# Patient Record
Sex: FEMALE | Race: WHITE | NOT HISPANIC OR LATINO | Employment: FULL TIME | URBAN - METROPOLITAN AREA
[De-identification: names, ages, dates, MRNs, and addresses within clinical notes are randomized per-mention and may not be internally consistent; named-entity substitution may affect disease eponyms.]

---

## 2017-01-31 ENCOUNTER — ALLSCRIPTS OFFICE VISIT (OUTPATIENT)
Dept: OTHER | Facility: OTHER | Age: 57
End: 2017-01-31

## 2017-02-24 ENCOUNTER — ALLSCRIPTS OFFICE VISIT (OUTPATIENT)
Dept: OTHER | Facility: OTHER | Age: 57
End: 2017-02-24

## 2017-08-08 ENCOUNTER — ALLSCRIPTS OFFICE VISIT (OUTPATIENT)
Dept: OTHER | Facility: OTHER | Age: 57
End: 2017-08-08

## 2017-08-08 DIAGNOSIS — Z12.31 ENCOUNTER FOR SCREENING MAMMOGRAM FOR MALIGNANT NEOPLASM OF BREAST: ICD-10-CM

## 2017-08-08 DIAGNOSIS — Z78.0 ASYMPTOMATIC MENOPAUSAL STATE: ICD-10-CM

## 2017-08-08 LAB — OCCULT BLD, FECAL IMMUNOLOGICAL (HISTORICAL): NEGATIVE

## 2017-08-11 ENCOUNTER — ALLSCRIPTS OFFICE VISIT (OUTPATIENT)
Dept: OTHER | Facility: OTHER | Age: 57
End: 2017-08-11

## 2017-08-13 LAB
ADEQUACY: (HISTORICAL): NORMAL
CLINICIAN PROVIDIED ICD 9 OR 10 (HISTORICAL): NORMAL
COMMENT (HISTORICAL): NORMAL
DIAGNOSIS (HISTORICAL): NORMAL
HPV HIGH RISK (HISTORICAL): NEGATIVE
NOTE: (HISTORICAL): NORMAL
PERFORMED BY (HISTORICAL): NORMAL
TEST INFORMATION (HISTORICAL): NORMAL

## 2017-08-14 ENCOUNTER — GENERIC CONVERSION - ENCOUNTER (OUTPATIENT)
Dept: OTHER | Facility: OTHER | Age: 57
End: 2017-08-14

## 2017-08-25 ENCOUNTER — ALLSCRIPTS OFFICE VISIT (OUTPATIENT)
Dept: OTHER | Facility: OTHER | Age: 57
End: 2017-08-25

## 2017-08-26 ENCOUNTER — APPOINTMENT (EMERGENCY)
Dept: CT IMAGING | Facility: HOSPITAL | Age: 57
End: 2017-08-26
Payer: COMMERCIAL

## 2017-08-26 ENCOUNTER — HOSPITAL ENCOUNTER (EMERGENCY)
Facility: HOSPITAL | Age: 57
Discharge: HOME | End: 2017-08-26
Attending: EMERGENCY MEDICINE | Admitting: EMERGENCY MEDICINE
Payer: COMMERCIAL

## 2017-08-26 VITALS
DIASTOLIC BLOOD PRESSURE: 60 MMHG | SYSTOLIC BLOOD PRESSURE: 123 MMHG | RESPIRATION RATE: 18 BRPM | WEIGHT: 155.42 LBS | HEART RATE: 90 BPM | TEMPERATURE: 98.1 F | OXYGEN SATURATION: 100 %

## 2017-08-26 DIAGNOSIS — L04.9 LYMPHADENITIS, ACUTE: Primary | ICD-10-CM

## 2017-08-26 LAB
ANION GAP SERPL CALCULATED.3IONS-SCNC: 6 MMOL/L (ref 4–13)
BASOPHILS # BLD AUTO: 0.02 THOUSANDS/ΜL (ref 0–0.1)
BASOPHILS NFR BLD AUTO: 0 % (ref 0–1)
BUN SERPL-MCNC: 15 MG/DL (ref 5–25)
CALCIUM SERPL-MCNC: 9 MG/DL (ref 8.3–10.1)
CHLORIDE SERPL-SCNC: 102 MMOL/L (ref 100–108)
CO2 SERPL-SCNC: 29 MMOL/L (ref 21–32)
CREAT SERPL-MCNC: 0.74 MG/DL (ref 0.6–1.3)
EOSINOPHIL # BLD AUTO: 0.06 THOUSAND/ΜL (ref 0–0.61)
EOSINOPHIL NFR BLD AUTO: 0 % (ref 0–6)
ERYTHROCYTE [DISTWIDTH] IN BLOOD BY AUTOMATED COUNT: 13.5 % (ref 11.6–15.1)
GFR SERPL CREATININE-BSD FRML MDRD: 90 ML/MIN/1.73SQ M
GLUCOSE SERPL-MCNC: 99 MG/DL (ref 65–140)
HCT VFR BLD AUTO: 37.7 % (ref 34.8–46.1)
HGB BLD-MCNC: 12.6 G/DL (ref 11.5–15.4)
LYMPHOCYTES # BLD AUTO: 2.01 THOUSANDS/ΜL (ref 0.6–4.47)
LYMPHOCYTES NFR BLD AUTO: 15 % (ref 14–44)
MCH RBC QN AUTO: 29.6 PG (ref 26.8–34.3)
MCHC RBC AUTO-ENTMCNC: 33.4 G/DL (ref 31.4–37.4)
MCV RBC AUTO: 89 FL (ref 82–98)
MONOCYTES # BLD AUTO: 0.94 THOUSAND/ΜL (ref 0.17–1.22)
MONOCYTES NFR BLD AUTO: 7 % (ref 4–12)
NEUTROPHILS # BLD AUTO: 10.78 THOUSANDS/ΜL (ref 1.85–7.62)
NEUTS SEG NFR BLD AUTO: 78 % (ref 43–75)
PLATELET # BLD AUTO: 289 THOUSANDS/UL (ref 149–390)
PMV BLD AUTO: 10.1 FL (ref 8.9–12.7)
POTASSIUM SERPL-SCNC: 4.2 MMOL/L (ref 3.5–5.3)
RBC # BLD AUTO: 4.25 MILLION/UL (ref 3.81–5.12)
SODIUM SERPL-SCNC: 137 MMOL/L (ref 136–145)
WBC # BLD AUTO: 13.81 THOUSAND/UL (ref 4.31–10.16)

## 2017-08-26 PROCEDURE — 36415 COLL VENOUS BLD VENIPUNCTURE: CPT | Performed by: EMERGENCY MEDICINE

## 2017-08-26 PROCEDURE — 70491 CT SOFT TISSUE NECK W/DYE: CPT

## 2017-08-26 PROCEDURE — 85025 COMPLETE CBC W/AUTO DIFF WBC: CPT | Performed by: EMERGENCY MEDICINE

## 2017-08-26 PROCEDURE — 99284 EMERGENCY DEPT VISIT MOD MDM: CPT

## 2017-08-26 PROCEDURE — 80048 BASIC METABOLIC PNL TOTAL CA: CPT | Performed by: EMERGENCY MEDICINE

## 2017-08-26 PROCEDURE — 96374 THER/PROPH/DIAG INJ IV PUSH: CPT

## 2017-08-26 PROCEDURE — 96361 HYDRATE IV INFUSION ADD-ON: CPT

## 2017-08-26 RX ORDER — KETOROLAC TROMETHAMINE 30 MG/ML
15 INJECTION, SOLUTION INTRAMUSCULAR; INTRAVENOUS ONCE
Status: COMPLETED | OUTPATIENT
Start: 2017-08-26 | End: 2017-08-26

## 2017-08-26 RX ORDER — LANOLIN ALCOHOL/MO/W.PET/CERES
1 CREAM (GRAM) TOPICAL 3 TIMES DAILY
COMMUNITY

## 2017-08-26 RX ORDER — AMOXICILLIN AND CLAVULANATE POTASSIUM 875; 125 MG/1; MG/1
1 TABLET, FILM COATED ORAL 2 TIMES DAILY
Qty: 19 TABLET | Refills: 0 | Status: SHIPPED | OUTPATIENT
Start: 2017-08-26 | End: 2017-09-05 | Stop reason: HOSPADM

## 2017-08-26 RX ORDER — NAPROXEN 500 MG/1
500 TABLET ORAL 2 TIMES DAILY PRN
Qty: 20 TABLET | Refills: 0 | Status: SHIPPED | OUTPATIENT
Start: 2017-08-26 | End: 2018-09-08 | Stop reason: ALTCHOICE

## 2017-08-26 RX ORDER — CITALOPRAM 20 MG/1
20 TABLET ORAL DAILY
COMMUNITY
End: 2018-03-03 | Stop reason: SDUPTHER

## 2017-08-26 RX ORDER — AMOXICILLIN AND CLAVULANATE POTASSIUM 875; 125 MG/1; MG/1
1 TABLET, FILM COATED ORAL ONCE
Status: COMPLETED | OUTPATIENT
Start: 2017-08-26 | End: 2017-08-26

## 2017-08-26 RX ORDER — MULTIVITAMIN
1 TABLET ORAL DAILY
COMMUNITY

## 2017-08-26 RX ADMIN — KETOROLAC TROMETHAMINE 15 MG: 30 INJECTION, SOLUTION INTRAMUSCULAR at 10:19

## 2017-08-26 RX ADMIN — AMOXICILLIN AND CLAVULANATE POTASSIUM 1 TABLET: 875; 125 TABLET, FILM COATED ORAL at 10:56

## 2017-08-26 RX ADMIN — IOHEXOL 85 ML: 350 INJECTION, SOLUTION INTRAVENOUS at 09:24

## 2017-08-26 RX ADMIN — SODIUM CHLORIDE 1000 ML: 0.9 INJECTION, SOLUTION INTRAVENOUS at 08:30

## 2017-08-27 ENCOUNTER — GENERIC CONVERSION - ENCOUNTER (OUTPATIENT)
Dept: OTHER | Facility: OTHER | Age: 57
End: 2017-08-27

## 2017-09-01 ENCOUNTER — HOSPITAL ENCOUNTER (INPATIENT)
Facility: HOSPITAL | Age: 57
LOS: 3 days | Discharge: HOME | DRG: 815 | End: 2017-09-05
Attending: EMERGENCY MEDICINE | Admitting: INTERNAL MEDICINE
Payer: COMMERCIAL

## 2017-09-01 DIAGNOSIS — L02.11 ABSCESS, NECK: Primary | ICD-10-CM

## 2017-09-01 LAB
ANION GAP SERPL CALCULATED.3IONS-SCNC: 8 MMOL/L (ref 4–13)
BASOPHILS # BLD AUTO: 0.01 THOUSANDS/ΜL (ref 0–0.1)
BASOPHILS NFR BLD AUTO: 0 % (ref 0–1)
BUN SERPL-MCNC: 24 MG/DL (ref 5–25)
CALCIUM SERPL-MCNC: 8.8 MG/DL (ref 8.3–10.1)
CHLORIDE SERPL-SCNC: 92 MMOL/L (ref 100–108)
CO2 SERPL-SCNC: 27 MMOL/L (ref 21–32)
CREAT SERPL-MCNC: 0.76 MG/DL (ref 0.6–1.3)
EOSINOPHIL # BLD AUTO: 0.06 THOUSAND/ΜL (ref 0–0.61)
EOSINOPHIL NFR BLD AUTO: 0 % (ref 0–6)
ERYTHROCYTE [DISTWIDTH] IN BLOOD BY AUTOMATED COUNT: 12.6 % (ref 11.6–15.1)
GFR SERPL CREATININE-BSD FRML MDRD: 87 ML/MIN/1.73SQ M
GLUCOSE SERPL-MCNC: 108 MG/DL (ref 65–140)
HCT VFR BLD AUTO: 33.9 % (ref 34.8–46.1)
HGB BLD-MCNC: 11.8 G/DL (ref 11.5–15.4)
LYMPHOCYTES # BLD AUTO: 1.87 THOUSANDS/ΜL (ref 0.6–4.47)
LYMPHOCYTES NFR BLD AUTO: 13 % (ref 14–44)
MCH RBC QN AUTO: 30 PG (ref 26.8–34.3)
MCHC RBC AUTO-ENTMCNC: 34.8 G/DL (ref 31.4–37.4)
MCV RBC AUTO: 86 FL (ref 82–98)
MONOCYTES # BLD AUTO: 1.21 THOUSAND/ΜL (ref 0.17–1.22)
MONOCYTES NFR BLD AUTO: 9 % (ref 4–12)
NEUTROPHILS # BLD AUTO: 10.99 THOUSANDS/ΜL (ref 1.85–7.62)
NEUTS SEG NFR BLD AUTO: 78 % (ref 43–75)
NRBC BLD AUTO-RTO: 0 /100 WBCS
PLATELET # BLD AUTO: 377 THOUSANDS/UL (ref 149–390)
PMV BLD AUTO: 10.5 FL (ref 8.9–12.7)
POTASSIUM SERPL-SCNC: 3.9 MMOL/L (ref 3.5–5.3)
RBC # BLD AUTO: 3.93 MILLION/UL (ref 3.81–5.12)
SODIUM SERPL-SCNC: 127 MMOL/L (ref 136–145)
WBC # BLD AUTO: 14.18 THOUSAND/UL (ref 4.31–10.16)

## 2017-09-01 PROCEDURE — 99284 EMERGENCY DEPT VISIT MOD MDM: CPT

## 2017-09-01 PROCEDURE — 85025 COMPLETE CBC W/AUTO DIFF WBC: CPT | Performed by: EMERGENCY MEDICINE

## 2017-09-01 PROCEDURE — 87040 BLOOD CULTURE FOR BACTERIA: CPT | Performed by: EMERGENCY MEDICINE

## 2017-09-01 PROCEDURE — 36415 COLL VENOUS BLD VENIPUNCTURE: CPT | Performed by: EMERGENCY MEDICINE

## 2017-09-01 PROCEDURE — 80048 BASIC METABOLIC PNL TOTAL CA: CPT | Performed by: EMERGENCY MEDICINE

## 2017-09-01 RX ORDER — ONDANSETRON 2 MG/ML
4 INJECTION INTRAMUSCULAR; INTRAVENOUS ONCE
Status: COMPLETED | OUTPATIENT
Start: 2017-09-01 | End: 2017-09-01

## 2017-09-01 RX ORDER — ACETAMINOPHEN 160 MG/5ML
650 SUSPENSION, ORAL (FINAL DOSE FORM) ORAL EVERY 4 HOURS PRN
Status: DISCONTINUED | OUTPATIENT
Start: 2017-09-01 | End: 2017-09-05 | Stop reason: HOSPADM

## 2017-09-01 RX ORDER — CITALOPRAM 20 MG/1
20 TABLET ORAL DAILY
Status: DISCONTINUED | OUTPATIENT
Start: 2017-09-02 | End: 2017-09-05 | Stop reason: HOSPADM

## 2017-09-01 RX ORDER — KETOROLAC TROMETHAMINE 30 MG/ML
15 INJECTION, SOLUTION INTRAMUSCULAR; INTRAVENOUS EVERY 6 HOURS PRN
Status: DISPENSED | OUTPATIENT
Start: 2017-09-01 | End: 2017-09-04

## 2017-09-01 RX ORDER — MORPHINE SULFATE 4 MG/ML
4 INJECTION, SOLUTION INTRAMUSCULAR; INTRAVENOUS ONCE
Status: COMPLETED | OUTPATIENT
Start: 2017-09-01 | End: 2017-09-01

## 2017-09-01 RX ORDER — DEXAMETHASONE SODIUM PHOSPHATE 4 MG/ML
4 INJECTION, SOLUTION INTRA-ARTICULAR; INTRALESIONAL; INTRAMUSCULAR; INTRAVENOUS; SOFT TISSUE EVERY 12 HOURS
Status: DISCONTINUED | OUTPATIENT
Start: 2017-09-02 | End: 2017-09-04

## 2017-09-01 RX ORDER — KETOROLAC TROMETHAMINE 30 MG/ML
15 INJECTION, SOLUTION INTRAMUSCULAR; INTRAVENOUS ONCE
Status: COMPLETED | OUTPATIENT
Start: 2017-09-01 | End: 2017-09-01

## 2017-09-01 RX ORDER — CITALOPRAM 10 MG/1
10 TABLET ORAL DAILY
Status: CANCELLED | OUTPATIENT
Start: 2017-09-02

## 2017-09-01 RX ORDER — CLINDAMYCIN PHOSPHATE 600 MG/50ML
600 INJECTION INTRAVENOUS ONCE
Status: DISCONTINUED | OUTPATIENT
Start: 2017-09-01 | End: 2017-09-01

## 2017-09-01 RX ORDER — MORPHINE SULFATE 2 MG/ML
2 INJECTION, SOLUTION INTRAMUSCULAR; INTRAVENOUS EVERY 4 HOURS PRN
Status: DISCONTINUED | OUTPATIENT
Start: 2017-09-01 | End: 2017-09-02

## 2017-09-01 RX ADMIN — KETOROLAC TROMETHAMINE 15 MG: 30 INJECTION, SOLUTION INTRAMUSCULAR at 21:20

## 2017-09-01 RX ADMIN — ONDANSETRON 4 MG: 2 INJECTION INTRAMUSCULAR; INTRAVENOUS at 21:21

## 2017-09-01 RX ADMIN — AMPICILLIN SODIUM AND SULBACTAM SODIUM 3 G: 2; 1 INJECTION, POWDER, FOR SOLUTION INTRAMUSCULAR; INTRAVENOUS at 23:48

## 2017-09-01 RX ADMIN — DEXAMETHASONE SODIUM PHOSPHATE 4 MG: 4 INJECTION, SOLUTION INTRAMUSCULAR; INTRAVENOUS at 23:48

## 2017-09-01 RX ADMIN — MORPHINE SULFATE 2 MG: 4 INJECTION, SOLUTION INTRAMUSCULAR; INTRAVENOUS at 21:21

## 2017-09-01 RX ADMIN — MORPHINE SULFATE 2 MG: 2 INJECTION, SOLUTION INTRAMUSCULAR; INTRAVENOUS at 23:52

## 2017-09-02 ENCOUNTER — APPOINTMENT (INPATIENT)
Dept: RADIOLOGY | Facility: HOSPITAL | Age: 57
DRG: 815 | End: 2017-09-02
Payer: COMMERCIAL

## 2017-09-02 PROBLEM — D72.829 LEUKOCYTOSIS: Status: ACTIVE | Noted: 2017-09-02

## 2017-09-02 PROBLEM — L02.11 ABSCESS, NECK: Status: ACTIVE | Noted: 2017-09-02

## 2017-09-02 PROBLEM — E87.1 HYPONATREMIA: Status: ACTIVE | Noted: 2017-09-02

## 2017-09-02 LAB
ANION GAP SERPL CALCULATED.3IONS-SCNC: 8 MMOL/L (ref 4–13)
BUN SERPL-MCNC: 24 MG/DL (ref 5–25)
CALCIUM SERPL-MCNC: 8.9 MG/DL (ref 8.3–10.1)
CHLORIDE SERPL-SCNC: 94 MMOL/L (ref 100–108)
CO2 SERPL-SCNC: 25 MMOL/L (ref 21–32)
CREAT SERPL-MCNC: 0.83 MG/DL (ref 0.6–1.3)
ERYTHROCYTE [DISTWIDTH] IN BLOOD BY AUTOMATED COUNT: 12.8 % (ref 11.6–15.1)
GFR SERPL CREATININE-BSD FRML MDRD: 79 ML/MIN/1.73SQ M
GLUCOSE SERPL-MCNC: 125 MG/DL (ref 65–140)
HCT VFR BLD AUTO: 32.9 % (ref 34.8–46.1)
HGB BLD-MCNC: 11.5 G/DL (ref 11.5–15.4)
MAGNESIUM SERPL-MCNC: 2.5 MG/DL (ref 1.6–2.6)
MCH RBC QN AUTO: 29.9 PG (ref 26.8–34.3)
MCHC RBC AUTO-ENTMCNC: 35 G/DL (ref 31.4–37.4)
MCV RBC AUTO: 86 FL (ref 82–98)
PLATELET # BLD AUTO: 335 THOUSANDS/UL (ref 149–390)
PMV BLD AUTO: 9.9 FL (ref 8.9–12.7)
POTASSIUM SERPL-SCNC: 4.4 MMOL/L (ref 3.5–5.3)
RBC # BLD AUTO: 3.85 MILLION/UL (ref 3.81–5.12)
SODIUM SERPL-SCNC: 127 MMOL/L (ref 136–145)
WBC # BLD AUTO: 16.31 THOUSAND/UL (ref 4.31–10.16)

## 2017-09-02 PROCEDURE — 87206 SMEAR FLUORESCENT/ACID STAI: CPT | Performed by: OTOLARYNGOLOGY

## 2017-09-02 PROCEDURE — 87077 CULTURE AEROBIC IDENTIFY: CPT | Performed by: OTOLARYNGOLOGY

## 2017-09-02 PROCEDURE — 80048 BASIC METABOLIC PNL TOTAL CA: CPT | Performed by: INTERNAL MEDICINE

## 2017-09-02 PROCEDURE — 87205 SMEAR GRAM STAIN: CPT | Performed by: OTOLARYNGOLOGY

## 2017-09-02 PROCEDURE — 83735 ASSAY OF MAGNESIUM: CPT | Performed by: INTERNAL MEDICINE

## 2017-09-02 PROCEDURE — 85027 COMPLETE CBC AUTOMATED: CPT | Performed by: INTERNAL MEDICINE

## 2017-09-02 PROCEDURE — 87075 CULTR BACTERIA EXCEPT BLOOD: CPT | Performed by: OTOLARYNGOLOGY

## 2017-09-02 PROCEDURE — 0W923ZX DRAINAGE OF FACE, PERCUTANEOUS APPROACH, DIAGNOSTIC: ICD-10-PCS | Performed by: OTOLARYNGOLOGY

## 2017-09-02 PROCEDURE — 87116 MYCOBACTERIA CULTURE: CPT | Performed by: OTOLARYNGOLOGY

## 2017-09-02 PROCEDURE — 76536 US EXAM OF HEAD AND NECK: CPT

## 2017-09-02 PROCEDURE — 86480 TB TEST CELL IMMUN MEASURE: CPT | Performed by: PHYSICIAN ASSISTANT

## 2017-09-02 PROCEDURE — 87070 CULTURE OTHR SPECIMN AEROBIC: CPT | Performed by: OTOLARYNGOLOGY

## 2017-09-02 RX ORDER — SODIUM CHLORIDE 9 MG/ML
100 INJECTION, SOLUTION INTRAVENOUS CONTINUOUS
Status: DISCONTINUED | OUTPATIENT
Start: 2017-09-02 | End: 2017-09-02

## 2017-09-02 RX ORDER — MORPHINE SULFATE 2 MG/ML
2 INJECTION, SOLUTION INTRAMUSCULAR; INTRAVENOUS
Status: DISCONTINUED | OUTPATIENT
Start: 2017-09-02 | End: 2017-09-05 | Stop reason: HOSPADM

## 2017-09-02 RX ORDER — MORPHINE SULFATE 2 MG/ML
2 INJECTION, SOLUTION INTRAMUSCULAR; INTRAVENOUS ONCE
Status: COMPLETED | OUTPATIENT
Start: 2017-09-02 | End: 2017-09-02

## 2017-09-02 RX ADMIN — KETOROLAC TROMETHAMINE 15 MG: 30 INJECTION, SOLUTION INTRAMUSCULAR at 21:28

## 2017-09-02 RX ADMIN — KETOROLAC TROMETHAMINE 15 MG: 30 INJECTION, SOLUTION INTRAMUSCULAR at 10:09

## 2017-09-02 RX ADMIN — MORPHINE SULFATE 2 MG: 2 INJECTION, SOLUTION INTRAMUSCULAR; INTRAVENOUS at 12:47

## 2017-09-02 RX ADMIN — DEXAMETHASONE SODIUM PHOSPHATE 4 MG: 4 INJECTION, SOLUTION INTRAMUSCULAR; INTRAVENOUS at 23:40

## 2017-09-02 RX ADMIN — AMPICILLIN SODIUM AND SULBACTAM SODIUM 3 G: 2; 1 INJECTION, POWDER, FOR SOLUTION INTRAMUSCULAR; INTRAVENOUS at 10:03

## 2017-09-02 RX ADMIN — SODIUM CHLORIDE 100 ML/HR: 0.9 INJECTION, SOLUTION INTRAVENOUS at 10:01

## 2017-09-02 RX ADMIN — DEXAMETHASONE SODIUM PHOSPHATE 4 MG: 4 INJECTION, SOLUTION INTRAMUSCULAR; INTRAVENOUS at 11:50

## 2017-09-02 RX ADMIN — AMPICILLIN SODIUM AND SULBACTAM SODIUM 3 G: 2; 1 INJECTION, POWDER, FOR SOLUTION INTRAMUSCULAR; INTRAVENOUS at 05:27

## 2017-09-02 RX ADMIN — AMPICILLIN SODIUM AND SULBACTAM SODIUM 3 G: 2; 1 INJECTION, POWDER, FOR SOLUTION INTRAMUSCULAR; INTRAVENOUS at 23:16

## 2017-09-02 RX ADMIN — MORPHINE SULFATE 2 MG: 2 INJECTION, SOLUTION INTRAMUSCULAR; INTRAVENOUS at 05:13

## 2017-09-02 RX ADMIN — CITALOPRAM HYDROBROMIDE 20 MG: 20 TABLET ORAL at 08:43

## 2017-09-02 RX ADMIN — SODIUM CHLORIDE 100 ML/HR: 0.9 INJECTION, SOLUTION INTRAVENOUS at 01:18

## 2017-09-02 RX ADMIN — AMPICILLIN SODIUM AND SULBACTAM SODIUM 3 G: 2; 1 INJECTION, POWDER, FOR SOLUTION INTRAMUSCULAR; INTRAVENOUS at 16:22

## 2017-09-02 RX ADMIN — KETOROLAC TROMETHAMINE 15 MG: 30 INJECTION, SOLUTION INTRAMUSCULAR at 01:06

## 2017-09-02 RX ADMIN — MORPHINE SULFATE 2 MG: 2 INJECTION, SOLUTION INTRAMUSCULAR; INTRAVENOUS at 01:18

## 2017-09-03 LAB
ANION GAP SERPL CALCULATED.3IONS-SCNC: 8 MMOL/L (ref 4–13)
BUN SERPL-MCNC: 15 MG/DL (ref 5–25)
CALCIUM SERPL-MCNC: 9 MG/DL (ref 8.3–10.1)
CHLORIDE SERPL-SCNC: 103 MMOL/L (ref 100–108)
CO2 SERPL-SCNC: 25 MMOL/L (ref 21–32)
CREAT SERPL-MCNC: 0.71 MG/DL (ref 0.6–1.3)
ERYTHROCYTE [DISTWIDTH] IN BLOOD BY AUTOMATED COUNT: 13.1 % (ref 11.6–15.1)
GFR SERPL CREATININE-BSD FRML MDRD: 95 ML/MIN/1.73SQ M
GLUCOSE SERPL-MCNC: 116 MG/DL (ref 65–140)
HCT VFR BLD AUTO: 34.7 % (ref 34.8–46.1)
HGB BLD-MCNC: 11.9 G/DL (ref 11.5–15.4)
MCH RBC QN AUTO: 29.9 PG (ref 26.8–34.3)
MCHC RBC AUTO-ENTMCNC: 34.3 G/DL (ref 31.4–37.4)
MCV RBC AUTO: 87 FL (ref 82–98)
PLATELET # BLD AUTO: 401 THOUSANDS/UL (ref 149–390)
PMV BLD AUTO: 10.3 FL (ref 8.9–12.7)
POTASSIUM SERPL-SCNC: 4.3 MMOL/L (ref 3.5–5.3)
RBC # BLD AUTO: 3.98 MILLION/UL (ref 3.81–5.12)
SODIUM SERPL-SCNC: 136 MMOL/L (ref 136–145)
WBC # BLD AUTO: 12.13 THOUSAND/UL (ref 4.31–10.16)

## 2017-09-03 PROCEDURE — 86777 TOXOPLASMA ANTIBODY: CPT | Performed by: INTERNAL MEDICINE

## 2017-09-03 PROCEDURE — 80048 BASIC METABOLIC PNL TOTAL CA: CPT | Performed by: PHYSICIAN ASSISTANT

## 2017-09-03 PROCEDURE — 85027 COMPLETE CBC AUTOMATED: CPT | Performed by: PHYSICIAN ASSISTANT

## 2017-09-03 PROCEDURE — 86778 TOXOPLASMA ANTIBODY IGM: CPT | Performed by: INTERNAL MEDICINE

## 2017-09-03 PROCEDURE — 86611 BARTONELLA ANTIBODY: CPT | Performed by: INTERNAL MEDICINE

## 2017-09-03 RX ORDER — FAMOTIDINE 20 MG/1
20 TABLET, FILM COATED ORAL DAILY
Status: DISCONTINUED | OUTPATIENT
Start: 2017-09-03 | End: 2017-09-05 | Stop reason: HOSPADM

## 2017-09-03 RX ADMIN — AMPICILLIN SODIUM AND SULBACTAM SODIUM 3 G: 2; 1 INJECTION, POWDER, FOR SOLUTION INTRAMUSCULAR; INTRAVENOUS at 05:31

## 2017-09-03 RX ADMIN — CITALOPRAM HYDROBROMIDE 20 MG: 20 TABLET ORAL at 08:29

## 2017-09-03 RX ADMIN — FAMOTIDINE 20 MG: 20 TABLET ORAL at 12:38

## 2017-09-03 RX ADMIN — AMPICILLIN SODIUM AND SULBACTAM SODIUM 3 G: 2; 1 INJECTION, POWDER, FOR SOLUTION INTRAMUSCULAR; INTRAVENOUS at 12:38

## 2017-09-03 RX ADMIN — DEXAMETHASONE SODIUM PHOSPHATE 4 MG: 4 INJECTION, SOLUTION INTRAMUSCULAR; INTRAVENOUS at 22:46

## 2017-09-03 RX ADMIN — AMPICILLIN SODIUM AND SULBACTAM SODIUM 3 G: 2; 1 INJECTION, POWDER, FOR SOLUTION INTRAMUSCULAR; INTRAVENOUS at 22:46

## 2017-09-03 RX ADMIN — DEXAMETHASONE SODIUM PHOSPHATE 4 MG: 4 INJECTION, SOLUTION INTRAMUSCULAR; INTRAVENOUS at 13:44

## 2017-09-03 RX ADMIN — AMPICILLIN SODIUM AND SULBACTAM SODIUM 3 G: 2; 1 INJECTION, POWDER, FOR SOLUTION INTRAMUSCULAR; INTRAVENOUS at 18:15

## 2017-09-04 ENCOUNTER — APPOINTMENT (INPATIENT)
Dept: RADIOLOGY | Facility: HOSPITAL | Age: 57
DRG: 815 | End: 2017-09-04
Payer: COMMERCIAL

## 2017-09-04 LAB
BACTERIA SPEC ANAEROBE CULT: NORMAL
BASOPHILS # BLD AUTO: 0.01 THOUSANDS/ΜL (ref 0–0.1)
BASOPHILS NFR BLD AUTO: 0 % (ref 0–1)
EOSINOPHIL # BLD AUTO: 0 THOUSAND/ΜL (ref 0–0.61)
EOSINOPHIL NFR BLD AUTO: 0 % (ref 0–6)
ERYTHROCYTE [DISTWIDTH] IN BLOOD BY AUTOMATED COUNT: 13.3 % (ref 11.6–15.1)
HCT VFR BLD AUTO: 34.5 % (ref 34.8–46.1)
HGB BLD-MCNC: 11.8 G/DL (ref 11.5–15.4)
LYMPHOCYTES # BLD AUTO: 1.69 THOUSANDS/ΜL (ref 0.6–4.47)
LYMPHOCYTES NFR BLD AUTO: 15 % (ref 14–44)
MCH RBC QN AUTO: 30.3 PG (ref 26.8–34.3)
MCHC RBC AUTO-ENTMCNC: 34.2 G/DL (ref 31.4–37.4)
MCV RBC AUTO: 89 FL (ref 82–98)
MONOCYTES # BLD AUTO: 0.41 THOUSAND/ΜL (ref 0.17–1.22)
MONOCYTES NFR BLD AUTO: 4 % (ref 4–12)
NEUTROPHILS # BLD AUTO: 9.47 THOUSANDS/ΜL (ref 1.85–7.62)
NEUTS SEG NFR BLD AUTO: 81 % (ref 43–75)
NRBC BLD AUTO-RTO: 0 /100 WBCS
PLATELET # BLD AUTO: 408 THOUSANDS/UL (ref 149–390)
PMV BLD AUTO: 9.8 FL (ref 8.9–12.7)
RBC # BLD AUTO: 3.9 MILLION/UL (ref 3.81–5.12)
WBC # BLD AUTO: 11.62 THOUSAND/UL (ref 4.31–10.16)

## 2017-09-04 PROCEDURE — 85025 COMPLETE CBC W/AUTO DIFF WBC: CPT | Performed by: INTERNAL MEDICINE

## 2017-09-04 PROCEDURE — 76536 US EXAM OF HEAD AND NECK: CPT

## 2017-09-04 RX ADMIN — AMPICILLIN SODIUM AND SULBACTAM SODIUM 3 G: 2; 1 INJECTION, POWDER, FOR SOLUTION INTRAMUSCULAR; INTRAVENOUS at 12:33

## 2017-09-04 RX ADMIN — AMPICILLIN SODIUM AND SULBACTAM SODIUM 3 G: 2; 1 INJECTION, POWDER, FOR SOLUTION INTRAMUSCULAR; INTRAVENOUS at 23:33

## 2017-09-04 RX ADMIN — CITALOPRAM HYDROBROMIDE 20 MG: 20 TABLET ORAL at 08:50

## 2017-09-04 RX ADMIN — AMPICILLIN SODIUM AND SULBACTAM SODIUM 3 G: 2; 1 INJECTION, POWDER, FOR SOLUTION INTRAMUSCULAR; INTRAVENOUS at 17:59

## 2017-09-04 RX ADMIN — FAMOTIDINE 20 MG: 20 TABLET ORAL at 08:50

## 2017-09-04 RX ADMIN — AMPICILLIN SODIUM AND SULBACTAM SODIUM 3 G: 2; 1 INJECTION, POWDER, FOR SOLUTION INTRAMUSCULAR; INTRAVENOUS at 05:34

## 2017-09-05 VITALS
BODY MASS INDEX: 25.45 KG/M2 | TEMPERATURE: 98.1 F | DIASTOLIC BLOOD PRESSURE: 54 MMHG | HEART RATE: 56 BPM | RESPIRATION RATE: 18 BRPM | OXYGEN SATURATION: 96 % | SYSTOLIC BLOOD PRESSURE: 106 MMHG | HEIGHT: 65 IN | WEIGHT: 152.78 LBS

## 2017-09-05 PROBLEM — L04.9 SUPPURATIVE LYMPHADENITIS: Status: ACTIVE | Noted: 2017-09-02

## 2017-09-05 PROBLEM — E87.1 HYPONATREMIA: Status: RESOLVED | Noted: 2017-09-02 | Resolved: 2017-09-05

## 2017-09-05 RX ORDER — ACETAMINOPHEN 325 MG/1
650 TABLET ORAL EVERY 4 HOURS PRN
Qty: 30 TABLET | Refills: 0
Start: 2017-09-05 | End: 2018-09-08 | Stop reason: ALTCHOICE

## 2017-09-05 RX ORDER — ACETAMINOPHEN 325 MG/1
650 TABLET ORAL EVERY 4 HOURS PRN
Status: DISCONTINUED | OUTPATIENT
Start: 2017-09-05 | End: 2017-09-05 | Stop reason: HOSPADM

## 2017-09-05 RX ORDER — AMOXICILLIN 875 MG/1
875 TABLET, COATED ORAL 2 TIMES DAILY
Qty: 20 TABLET | Refills: 0 | Status: SHIPPED | OUTPATIENT
Start: 2017-09-05 | End: 2017-09-15

## 2017-09-05 RX ADMIN — CITALOPRAM HYDROBROMIDE 20 MG: 20 TABLET ORAL at 08:58

## 2017-09-05 RX ADMIN — AMPICILLIN SODIUM AND SULBACTAM SODIUM 3 G: 2; 1 INJECTION, POWDER, FOR SOLUTION INTRAMUSCULAR; INTRAVENOUS at 05:46

## 2017-09-05 RX ADMIN — AMPICILLIN SODIUM AND SULBACTAM SODIUM 3 G: 2; 1 INJECTION, POWDER, FOR SOLUTION INTRAMUSCULAR; INTRAVENOUS at 11:51

## 2017-09-05 RX ADMIN — FAMOTIDINE 20 MG: 20 TABLET ORAL at 08:58

## 2017-09-06 LAB
ANNOTATION COMMENT IMP: NORMAL
B HENSELAE IGG TITR SER IF: NEGATIVE TITER
B HENSELAE IGM TITR SER IF: NEGATIVE TITER
B QUINTANA IGG TITR SER IF: NEGATIVE TITER
B QUINTANA IGM TITR SER IF: NEGATIVE TITER
BACTERIA WND AEROBE CULT: NORMAL
CLINICAL COMMENT: NORMAL
GAMMA INTERFERON BACKGROUND BLD IA-ACNC: 0.12 IU/ML
GRAM STN SPEC: NORMAL
GRAM STN SPEC: NORMAL
M TB IFN-G BLD-IMP: NEGATIVE
M TB IFN-G CD4+ BCKGRND COR BLD-ACNC: 0.03 IU/ML
M TB IFN-G CD4+ T-CELLS BLD-ACNC: 0.15 IU/ML
MITOGEN IGNF BLD-ACNC: 0.65 IU/ML
QUANTIFERON-TB GOLD IN TUBE: NORMAL
SERVICE CMNT-IMP: NORMAL
T GONDII IGG SERPL IA-ACNC: <3 IU/ML (ref 0–7.1)
T GONDII IGM SER IA-ACNC: <3 AU/ML (ref 0–7.9)

## 2017-09-07 ENCOUNTER — GENERIC CONVERSION - ENCOUNTER (OUTPATIENT)
Dept: OTHER | Facility: OTHER | Age: 57
End: 2017-09-07

## 2017-09-07 LAB
BACTERIA BLD CULT: NORMAL
BACTERIA BLD CULT: NORMAL

## 2017-09-22 ENCOUNTER — ALLSCRIPTS OFFICE VISIT (OUTPATIENT)
Dept: OTHER | Facility: OTHER | Age: 57
End: 2017-09-22

## 2017-09-22 LAB
BILIRUB UR QL STRIP: NORMAL
CLARITY UR: NORMAL
COLOR UR: YELLOW
GLUCOSE (HISTORICAL): NORMAL
HGB UR QL STRIP.AUTO: 250
KETONES UR STRIP-MCNC: NORMAL MG/DL
LEUKOCYTE ESTERASE UR QL STRIP: 500
NITRITE UR QL STRIP: NORMAL
PH UR STRIP.AUTO: 7 [PH]
PROT UR STRIP-MCNC: NORMAL MG/DL
SP GR UR STRIP.AUTO: 1
UROBILINOGEN UR QL STRIP.AUTO: NORMAL

## 2017-10-16 LAB
MYCOBACTERIUM SPEC CULT: NORMAL
RHODAMINE-AURAMINE STN SPEC: NORMAL

## 2017-11-09 ENCOUNTER — ALLSCRIPTS OFFICE VISIT (OUTPATIENT)
Dept: OTHER | Facility: OTHER | Age: 57
End: 2017-11-09

## 2017-11-10 NOTE — PROGRESS NOTES
Assessment    1  Acute sinusitis, recurrence not specified, unspecified location (461 9) (J01 90)   2  BMI 26 0-26 9,adult (V85 22) (Z68 26)   3  Never a smoker    Plan  Acute sinusitis, recurrence not specified, unspecified location    · Amoxicillin-Pot Clavulanate 875-125 MG Oral Tablet; TAKE 1 TABLET EVERY 12HOURS DAILY   · Follow Up if Not Better Evaluation and Treatment  Follow-up  Status: Complete  Done:09Nov2017   · Drink plenty of fluids ; Status:Complete;   Done: 95FKK1357   · Gargle with warm salt water for 5 minutes every 4 hours ; Status:Complete;   Done:09Nov2017   · Irrigate your nose twice a day ; Status:Complete;   Done: 11BNY4309    Discussion/Summary    Drink plenty of fluids  Saline nasal rinses or spray as needed for congestion  Salt water gargles and hot tea with honey and lemon for sore throat  May use Mucinex D for sinus congestion  Take antibiotics until finished  Follow up as needed for persistent or worsening symptoms  Possible side effects of new medications were reviewed with the patient/guardian today  The treatment plan was reviewed with the patient/guardian  The patient/guardian understands and agrees with the treatment plan      Chief Complaint  C/O sore throat, nasal congestion and right neck is sore  Afebrile  S/S started 5 days ago ROBERToylBeryl      History of Present Illness  HPI: She thinks she has a sinus infection  She has sinus congestion, pressure, and her teeth hurt  She typically has sinus infections this time of year  She is taking OTC decongestants which help temporarily  strep lymphadenitis in September, saw ENT, node is still swollen, being monitored  Sees ENT on 11/26  Review of Systems   Constitutional: no fever-- and-- no chills  ENT: earache-- and-- nasal discharge, but-- no sore throat  Respiratory: no shortness of breath,-- no cough-- and-- no wheezing  Neurological: headache  Active Problems  1  Allergic rhinitis (477 9) (J30 9)   2   Anxiety disorder (300 00) (F41 9)   3  BMI 26 0-26 9,adult (V85 22) (Z68 26)   4  Bunion (727 1) (M21 619)   5  Colon cancer screening (V76 51) (Z12 11)   6  Depression (311) (F32 9)   7  Eczema (692 9) (L30 9)   8  Encounter for routine pelvic examination (V72 31) (Z01 419)   9  Generalized anxiety disorder (300 02) (F41 1)   10  Hammer toe (735 4) (M20 40)   11  Lymph node enlargement (785 6) (R59 9)   12  Lymphadenitis (289 3) (I88 9)   13  Never a smoker   14  Postmenopausal (V49 81) (Z78 0)   15  Screening for cardiovascular condition (V81 2) (Z13 6)   16  Submandibular gland swelling (782 3) (R60 9)   17  Visit for screening mammogram (V76 12) (Z12 31)    Past Medical History    1  History of Acute maxillary sinusitis (461 0) (J01 00)   2  History of Acute maxillary sinusitis (461 0) (J01 00)   3  Acute maxillary sinusitis, recurrence not specified (461 0) (J01 00)   4  History of Acute maxillary sinusitis, recurrence not specified (461 0) (J01 00)   5  History of Acute upper respiratory infection (465 9) (J06 9)   6  History of Acute URI (465 9) (J06 9)   7  History of Acute UTI (599 0) (N39 0)   8  History of Hip Sprain (843 9)   9  History of acute bronchitis (V12 69) (Z87 09)   10  History of acute conjunctivitis (V12 49) (Z86 69)   11  History of acute sinusitis (V12 69) (Z87 09)   12  History of acute sinusitis (V12 69) (Z87 09)   13  History of dermatitis (V13 3) (Z87 2)   14  History of headache (V13 89) (Z87 898)   15  History of influenza vaccination (V49 89) (Z92 29)   16  History of low back pain (V13 59) (Z87 39)   17  History of parotitis (V12 79) (Z87 19)   18  History of sciatica (V12 49) (Z86 69)   19  History of Insect bite of breast, infected (911 5,E906 4) (S20 169A,L08 9,W57  XXXA)   20  Need for vaccination for DTP (V06 1) (Z23)   21  History of Nonallopathic lesion (739 9) (M99 9)   22  History of Open Wound Of The Left Middle Finger (883 0)   23   History of Piriformis syndrome, unspecified laterality (355 0) (G57 00)   24  Preop examination (V72 84) (Z01 818)   25  History of Sprain of unspecified part of right wrist and hand, sequela (848 8) (S63 91XS)   26  History of Stye In Right Lower Eyelid (373 11)   27  History of Thigh Strain (843 9)   28  Vaginal candidiasis (112 1) (B37 3)  Active Problems And Past Medical History Reviewed: The active problems and past medical history were reviewed and updated today  Family History  Father    1  Family history of Colon carcinoma    Social History   · Never a smoker  The social history was reviewed and is unchanged  Surgical History    1  Denied: History Of Prior Surgery    Current Meds   1  Citalopram Hydrobromide 20 MG Oral Tablet; Take 1 tablet daily; Therapy: 81XAI3013 to (Evaluate:94Wfa8886)  Requested for: 95Oiw0444; Last Rx:54Cub3748 Ordered   2  Fluticasone Propionate 50 MCG/ACT Nasal Suspension; 2 puffs/nostril/day; Therapy: 65Upv7297 to (Last Rx:01Drh1885)  Requested for: 89Bya7314 Ordered   3  Glucosamine Chondroit-Collagen Oral Capsule; 1 Every Day; Therapy: 18UFB3135 to  Requested for: 90MNM3891 Recorded   4  Isometheptene-Dichloral-APAP -325 MG Oral Capsule; TAKE 1-2 CAPSULES EVERY 4 HOURS AS NEEDED  MAXIMUM 6 CAPSULES IN 24 HOURS; Therapy: 25DIU9566 to (Last Rx:54Zob0688)  Requested for: 50CJV4009; Status: ACTIVE - Renewal Denied Ordered   5  Multivitamins TABS; 1 Every Day; Therapy: 34TTG1033 to  Requested for: 45LKW2895 Recorded   6  Naproxen 500 MG Oral Tablet; 1 tab daily as needed; Therapy: 73RHC7827 to Recorded    The medication list was reviewed and updated today  Allergies  1  No Known Drug Allergies    Vitals   Recorded: 23UWI6277 09:37AM   Temperature 97 F   Heart Rate 80   Respiration 16   Systolic 230   Diastolic 72   Height 5 ft 3 in   Weight 152 lb    BMI Calculated 26 93   BSA Calculated 1 72       Physical Exam   Constitutional  General appearance: No acute distress, well appearing and well nourished     Eyes Conjunctiva and lids: No swelling, erythema or discharge  Ears, Nose, Mouth, and Throat  Otoscopic examination: Abnormal   The right tympanic membrane was bulging, but-- was not red  The left tympanic membrane was bulging, but-- was not red  Nasal mucosa, septum, and turbinates: Normal without edema or erythema  Oropharynx: Normal with no erythema, edema, exudate or lesions  Pulmonary  Respiratory effort: No increased work of breathing or signs of respiratory distress  Cardiovascular  Auscultation of heart: Normal rate and rhythm, normal S1 and S2, without murmurs  Lymphatic  Palpation of lymph nodes in neck: Abnormal   right submandibular node enlargement  Skin  Skin and subcutaneous tissue: Normal without rashes or lesions  Psychiatric  Mood and affect: Normal          Attending Note  Collaborating Physician Note: Collaborating Note: I agree with the Advanced Practitioner note        Signatures   Electronically signed by : Jackie Acosta; Nov 9 2017 10:13AM EST                       (Author)    Electronically signed by : Jagdeep Cespedes DO; Nov 9 2017 12:53PM EST                       (Review)

## 2018-01-09 NOTE — PROGRESS NOTES
Assessment    1  Encounter for preventive health examination (V70 0) (Z00 00)   2  Visit for screening mammogram (V76 12) (Z12 31)   3  Need for vaccination for DTP (V06 1) (Z23)   4  Postmenopausal (V49 81) (Z78 0)   5  Screening for cardiovascular condition (V81 2) (Z13 6)   6  Encounter for routine pelvic examination (V72 31) (Z01 419)   7  Colon cancer screening (V76 51) (Z12 11)    Plan  Anxiety disorder    · ALPRAZolam 0 5 MG Oral Tablet (Xanax); 1 Three Times A Day, As Needed  Anxiety disorder, Depression    · Citalopram Hydrobromide 20 MG Oral Tablet; Take 1 tablet daily  Colon cancer screening    · DIGITAL RECTAL EXAM; Status:Complete;   Done: 87VVI7745 02:56PM   · Hemoccult Screening - POC; Status:Complete;   Done: 60IDI9499 02:56PM  Encounter for routine pelvic examination    · (LC) Pap IG, HPV-hr; Status: In Progress - Specimen/Data Collected;   Done: 24AQG2386  Need for vaccination for DTP    · Adacel 5-2-15 5 LF-MCG/0 5 Intramuscular Suspension; INJECT 0 5  ML  Intramuscular; To Be Done: 77UJR4975  Postmenopausal    · * DXA BONE DENSITY SPINE HIP AND PELVIS; Status:Active; Requested  for:51Bvq2266;   Screening for cardiovascular condition    · (1) COMPREHENSIVE METABOLIC PANEL; Status:Active; Requested for:11Lhy0264;    · (1) LIPID PANEL FASTING W DIRECT LDL REFLEX; Status:Active; Requested  for:64Avq5889;   Visit for screening mammogram    · * MAMMO SCREENING BILATERAL W CAD; Status:Active; Requested for:93Tii2171;     Discussion/Summary  health maintenance visit Currently, she eats a healthy diet and has an adequate exercise regimen  HPV and Pap Co-testing Done Today Breast cancer screening: mammogram has been ordered  Colorectal cancer screening: colorectal cancer screening is current  The immunizations will be given as outlined in the orders  Chief Complaint  cpe      History of Present Illness  HM, Adult Female: The patient is being seen for a health maintenance and gynecology evaluation  General Health: The patient's health since the last visit is described as good  She has regular dental visits  Immunizations status: not up to date  Lifestyle:  She consumes a diverse and healthy diet  She does not have any weight concerns  She exercises regularly  She does not use tobacco    Reproductive health: the patient is postmenopausal    Screening: Additional History:  she been very active with her grandchildren  She has been resting now because of a stress fracture  She is wearing boot  Review of Systems    Constitutional: No fever, no chills, feels well, no tiredness, no recent weight gain or weight loss  Cardiovascular: No complaints of slow heart rate, no fast heart rate, no chest pain, no palpitations, no leg claudication, no lower extremity edema  Respiratory: No complaints of shortness of breath, no wheezing, no cough, no SOB on exertion, no orthopnea, no PND  Active Problems    1  Allergic rhinitis (477 9) (J30 9)   2  Anxiety disorder (300 00) (F41 9)   3  BMI 26 0-26 9,adult (V85 22) (Z68 26)   4  Bunion (727 1) (M21 619)   5  Depression (311) (F32 9)   6  Eczema (692 9) (L30 9)   7  Generalized anxiety disorder (300 02) (F41 1)   8  Hammer toe (735 4) (M20 40)   9  Headache (784 0) (R51)   10  Never a smoker   11   Visit for screening mammogram (V76 12) (Z12 31)    Past Medical History    · History of Acute maxillary sinusitis (461 0) (J01 00)   · History of Acute maxillary sinusitis (461 0) (J01 00)   · Acute maxillary sinusitis, recurrence not specified (461 0) (J01 00)   · History of Acute maxillary sinusitis, recurrence not specified (461 0) (J01 00)   · History of Acute upper respiratory infection (465 9) (J06 9)   · History of Acute URI (465 9) (J06 9)   · History of Hip Sprain (843 9)   · History of acute bronchitis (V12 69) (Z87 09)   · History of acute conjunctivitis (V12 49) (Z86 69)   · History of acute sinusitis (V12 69) (Z87 09)   · History of acute sinusitis (V12 69) (Z87 09)   · History of acute sinusitis (V12 69) (Z87 09)   · History of dermatitis (V13 3) (Z87 2)   · History of influenza vaccination (V49 89) (Z92 29)   · History of low back pain (V13 59) (Z87 39)   · History of sciatica (V12 49) (Z86 69)   · History of Insect bite of breast, infected (911 5,E906 4) (S20 169A,L08 9,W57  Kerri Ayala)   · History of Nonallopathic lesion (739 9) (M99 9)   · History of Open Wound Of The Left Middle Finger (883 0)   · History of Piriformis syndrome, unspecified laterality (355 0) (G57 00)   · Preop examination (V72 84) (Z01 818)   · History of Sprain of unspecified part of right wrist and hand, sequela (905 7) (S63 91XS)   · History of Stye In Right Lower Eyelid (373 11)   · History of Thigh Strain (843 9)   · Vaginal candidiasis (112 1) (B37 3)    Surgical History    · Denied: History Of Prior Surgery    Family History  Father    · Family history of Colon carcinoma    Social History    · Never a smoker    Current Meds   1  ALPRAZolam 0 5 MG Oral Tablet; 1 Three Times A Day, As Needed; Therapy: 68OZT7257 to (Last Rx:00Heh2786)  Requested for: 88Jjw4061; Status:   ACTIVE - Renewal Denied Ordered   2  Citalopram Hydrobromide 20 MG Oral Tablet; Take 1 tablet daily; Therapy: 14VMR5592 to (Oneil Smith)  Requested for: 29QMW4028; Last   Rx:29Mar2017 Ordered   3  Fluticasone Propionate 50 MCG/ACT Nasal Suspension; 2 puffs/nostril/day; Therapy: 82Azj4612 to (Last Rx:14Oct2015)  Requested for: 14Oct2015 Ordered   4  Glucosamine Chondroit-Collagen Oral Capsule; 1 Every Day; Therapy: 32TZT0718 to  Requested for: 14LJD9036 Recorded   5  Isometheptene-Dichloral-APAP -325 MG Oral Capsule; TAKE 1-2 CAPSULES   EVERY 4 HOURS AS NEEDED  MAXIMUM 6 CAPSULES   IN 24 HOURS; Therapy: 60OVJ1599 to (Last Rx:01Pjz8253)  Requested for: 32BUP8391; Status:   ACTIVE - Renewal Denied Ordered   6  Multivitamins TABS; 1 Every Day;    Therapy: 88BXQ0313 to  Requested for: 04CJX6744 Recorded    Allergies    1  No Known Drug Allergies    Vitals   Recorded: 37Kxs2388 02:24PM   Temperature 96 9 F   Heart Rate 72   Respiration 16   Systolic 96   Diastolic 56   Height Unobtainable Yes   Weight Unobtainable Yes     Physical Exam    Constitutional   General appearance: No acute distress, well appearing and well nourished  Ears, Nose, Mouth, and Throat   External inspection of ears and nose: Normal     Otoscopic examination: Tympanic membranes translucent with normal light reflex  Canals patent without erythema  Oropharynx: Normal with no erythema, edema, exudate or lesions  Pulmonary   Auscultation of lungs: Clear to auscultation  Cardiovascular   Auscultation of heart: Normal rate and rhythm, normal S1 and S2, no murmurs  Chest   Breasts: Normal, no dimpling or skin changes appreciated  Palpation of breasts and axillae: Normal, no masses palpated  Abdomen   Abdomen: Non-tender, no masses  Liver and spleen: No hepatomegaly or splenomegaly  Anus, perineum, and rectum: Normal sphincter tone, no masses, no prolapse  Stool sample for occult blood: Negative  Genitourinary   External genitalia and vagina: Normal, no lesions appreciated  Urethra: Normal, no discharge  Bladder: Not distended, no tenderness  Cervix: Normal, no lesions  Uterus: Normal size, no tenderness, no masses  Adnexa/Parametria: Normal, no masses or tenderness  Musculoskeletal   Gait and station: Normal     Neurologic   Reflexes: 2+ and symmetric  Psychiatric   Mood and affect: Normal        Procedure    Procedure: Indication: routine screening     Results: 20/20 in both eyes without corrective device, 20/20 in the right eye without corrective device, 20/20 in the left eye without corrective device   Color vision was and the results were normal       Signatures   Electronically signed by : Sadia Almonte DO; Aug  8 2017  2:58PM EST                       (Author)

## 2018-01-13 VITALS
SYSTOLIC BLOOD PRESSURE: 120 MMHG | RESPIRATION RATE: 16 BRPM | TEMPERATURE: 97.1 F | DIASTOLIC BLOOD PRESSURE: 72 MMHG | HEART RATE: 60 BPM | HEIGHT: 63 IN

## 2018-01-13 VITALS
WEIGHT: 151 LBS | DIASTOLIC BLOOD PRESSURE: 72 MMHG | TEMPERATURE: 98.4 F | BODY MASS INDEX: 26.75 KG/M2 | HEART RATE: 78 BPM | RESPIRATION RATE: 18 BRPM | SYSTOLIC BLOOD PRESSURE: 120 MMHG | HEIGHT: 63 IN

## 2018-01-13 VITALS
RESPIRATION RATE: 14 BRPM | SYSTOLIC BLOOD PRESSURE: 110 MMHG | BODY MASS INDEX: 26.75 KG/M2 | HEIGHT: 63 IN | WEIGHT: 151 LBS | DIASTOLIC BLOOD PRESSURE: 80 MMHG | TEMPERATURE: 98.2 F | HEART RATE: 80 BPM

## 2018-01-13 NOTE — RESULT NOTES
Verified Results  ([facility] [facility]) Pap IG, HPV-hr 34COX7766 12:00AM [facility], [address]    Ashley Pereira Endocervix  No  of containers  Ashley Pereira 01 CYTYC Thin Prep Vial     Test Name Result Flag Reference   DIAGNOSIS: Comment     NEGATIVE FOR INTRAEPITHELIAL LESION AND MALIGNANCY  CELLULAR CHANGES ASSOCIATED WITH ATROPHY ARE PRESENT  THE CYTOLOGY PROCESSING WAS PERFORMED AT THE [facility] LOCATED AT  [facility], [address] [address]  Specimen adequacy: Comment     Satisfactory for evaluation  Endocervical and/or squamous metaplastic  cells (endocervical component) are present  Clinician provided ICD10: Comment     Z01 419   Performed by: Rae Lino, Cytotechnologist (ASCP)   Comm   Note: Comment     The Pap smear is a screening test designed to aid in the detection of  premalignant and malignant conditions of the uterine cervix  It is not a  diagnostic procedure and should not be used as the sole means of detecting  cervical cancer  Both false-positive and false-negative reports do occur  Test Methodology: Comment     This liquid based ThinPrep(R) pap test was screened with the  use of an image guided system  HPV, high-risk Negative  Negative   This high-risk HPV test detects thirteen high-risk types  (16/18/31/33/35/39/45/51/52/56/58/59/68) without differentiation  DIAGNOSIS: Comment     NEGATIVE FOR INTRAEPITHELIAL LESION AND MALIGNANCY  CELLULAR CHANGES ASSOCIATED WITH ATROPHY ARE PRESENT  THE CYTOLOGY PROCESSING WAS PERFORMED AT THE [facility] LOC[facility] AT  [facility], [address] [address]  Specimen adequacy: Comment     Satisfactory for evaluation  Endocervical and/or squamous metaplastic  cells (endocervical component) are present  Clinician provided ICD10: Comment     Z01 419   Performed by: Rae Lino, Cytotechnologist (ASCP)   Comm        Note: Comment     The Pap smear is a screening test designed to aid in the detection of  premalignant and malignant conditions of the uterine cervix  It is not a  diagnostic procedure and should not be used as the sole means of detecting  cervical cancer  Both false-positive and false-negative reports do occur  Test Methodology: Comment     This liquid based ThinPrep(R) pap test was screened with the  use of an image guided system  HPV, high-risk Negative  Negative   This high-risk HPV test detects thirteen high-risk types  (16/18/31/33/35/39/45/51/52/56/58/59/68) without differentiation

## 2018-01-14 VITALS
DIASTOLIC BLOOD PRESSURE: 56 MMHG | SYSTOLIC BLOOD PRESSURE: 96 MMHG | RESPIRATION RATE: 16 BRPM | HEART RATE: 72 BPM | TEMPERATURE: 96.9 F

## 2018-01-15 VITALS
SYSTOLIC BLOOD PRESSURE: 118 MMHG | BODY MASS INDEX: 26.93 KG/M2 | DIASTOLIC BLOOD PRESSURE: 72 MMHG | RESPIRATION RATE: 16 BRPM | HEART RATE: 80 BPM | TEMPERATURE: 97 F | HEIGHT: 63 IN | WEIGHT: 152 LBS

## 2018-01-15 VITALS
TEMPERATURE: 97.3 F | HEIGHT: 63 IN | DIASTOLIC BLOOD PRESSURE: 56 MMHG | RESPIRATION RATE: 16 BRPM | HEART RATE: 72 BPM | WEIGHT: 153 LBS | SYSTOLIC BLOOD PRESSURE: 98 MMHG | BODY MASS INDEX: 27.11 KG/M2

## 2018-01-15 VITALS
TEMPERATURE: 97.6 F | HEART RATE: 78 BPM | RESPIRATION RATE: 14 BRPM | DIASTOLIC BLOOD PRESSURE: 70 MMHG | SYSTOLIC BLOOD PRESSURE: 108 MMHG

## 2018-01-15 NOTE — MISCELLANEOUS
Message  Message Free Text Note Form: 12:30 p m  patient called concerned that her swollen lymph node has gotten worse  She went to the emergency room yesterday and had a CT scan done of her neck and the swollen gland is actually a lymph node not a salivary gland  They changed her to Augmentin and is not helping  In fact the gland is getting worse and it is starting to make it harder to swallow  Though she is able to breathe without any problems and able to drink  When she was initially diagnosed with a swollen gland she was given Keflex and responded well to it know within a day of stopping it the swelling started to happen again  She does have an appointment with Dr Tariq Hazel tomorrow but is concerned that her swelling is getting worse  I changed her prescription to Keflex again and called in to [facility] if her breathing is at all affected she was advised to go back to the emergency room immediately or if she feels like the swelling is getting worse in her throat making it harder to swallow she should also go back to the emergency room  She she agreed to watch for any danger Signs and will otherwise follow up with Dr Tariq Hazel again tomorrow      Plan    1  Cephalexin 500 MG Oral Tablet;  Take 1 tablet twice daily    Signatures   Electronically signed by : Marcial Schneider DO; Aug 27 2017  7:40PM EST                       (Author)

## 2018-01-22 VITALS
BODY MASS INDEX: 26.58 KG/M2 | HEIGHT: 63 IN | HEART RATE: 62 BPM | TEMPERATURE: 97.2 F | RESPIRATION RATE: 16 BRPM | SYSTOLIC BLOOD PRESSURE: 130 MMHG | DIASTOLIC BLOOD PRESSURE: 80 MMHG | WEIGHT: 150 LBS

## 2018-02-19 LAB
ALBUMIN SERPL-MCNC: 4.4 G/DL (ref 3.5–5.5)
ALBUMIN/GLOB SERPL: 1.7 {RATIO} (ref 1.2–2.2)
ALP SERPL-CCNC: 25 IU/L (ref 39–117)
ALT SERPL-CCNC: 21 IU/L (ref 0–32)
AST SERPL-CCNC: 25 IU/L (ref 0–40)
BILIRUB SERPL-MCNC: 0.3 MG/DL (ref 0–1.2)
BUN SERPL-MCNC: 21 MG/DL (ref 6–24)
BUN/CREAT SERPL: 22 (ref 9–23)
CALCIUM SERPL-MCNC: 9.5 MG/DL (ref 8.7–10.2)
CHLORIDE SERPL-SCNC: 100 MMOL/L (ref 96–106)
CHOLEST SERPL-MCNC: 206 MG/DL (ref 100–199)
CO2 SERPL-SCNC: 24 MMOL/L (ref 18–29)
CREAT SERPL-MCNC: 0.97 MG/DL (ref 0.57–1)
GLOBULIN SER-MCNC: 2.6 G/DL (ref 1.5–4.5)
GLUCOSE SERPL-MCNC: 90 MG/DL (ref 65–99)
HDLC SERPL-MCNC: 85 MG/DL
LDLC SERPL CALC-MCNC: 104 MG/DL (ref 0–99)
MICRODELETION SYND BLD/T FISH: NORMAL
POTASSIUM SERPL-SCNC: 4.5 MMOL/L (ref 3.5–5.2)
PROT SERPL-MCNC: 7 G/DL (ref 6–8.5)
SL AMB EGFR AFRICAN AMERICAN: 75 ML/MIN/1.73
SL AMB EGFR NON AFRICAN AMERICAN: 65 ML/MIN/1.73
SODIUM SERPL-SCNC: 142 MMOL/L (ref 134–144)
TRIGL SERPL-MCNC: 84 MG/DL (ref 0–149)

## 2018-03-03 DIAGNOSIS — F41.1 GENERALIZED ANXIETY DISORDER: Primary | ICD-10-CM

## 2018-03-03 DIAGNOSIS — F32.A DEPRESSION, UNSPECIFIED DEPRESSION TYPE: ICD-10-CM

## 2018-03-04 PROBLEM — L04.9 SUPPURATIVE LYMPHADENITIS: Status: RESOLVED | Noted: 2017-09-02 | Resolved: 2018-03-04

## 2018-03-04 PROBLEM — D72.829 LEUKOCYTOSIS: Status: RESOLVED | Noted: 2017-09-02 | Resolved: 2018-03-04

## 2018-03-04 RX ORDER — CITALOPRAM 20 MG/1
TABLET ORAL
Qty: 90 TABLET | Refills: 1 | Status: SHIPPED | OUTPATIENT
Start: 2018-03-04 | End: 2018-08-30 | Stop reason: SDUPTHER

## 2018-06-09 ENCOUNTER — OFFICE VISIT (OUTPATIENT)
Dept: FAMILY MEDICINE CLINIC | Facility: CLINIC | Age: 58
End: 2018-06-09
Payer: COMMERCIAL

## 2018-06-09 VITALS
SYSTOLIC BLOOD PRESSURE: 100 MMHG | BODY MASS INDEX: 25.83 KG/M2 | HEART RATE: 62 BPM | HEIGHT: 65 IN | TEMPERATURE: 98.4 F | RESPIRATION RATE: 18 BRPM | DIASTOLIC BLOOD PRESSURE: 60 MMHG | WEIGHT: 155 LBS

## 2018-06-09 DIAGNOSIS — H10.33 ACUTE CONJUNCTIVITIS OF BOTH EYES, UNSPECIFIED ACUTE CONJUNCTIVITIS TYPE: ICD-10-CM

## 2018-06-09 DIAGNOSIS — J01.90 ACUTE SINUSITIS, RECURRENCE NOT SPECIFIED, UNSPECIFIED LOCATION: Primary | ICD-10-CM

## 2018-06-09 PROCEDURE — 99213 OFFICE O/P EST LOW 20 MIN: CPT | Performed by: NURSE PRACTITIONER

## 2018-06-09 RX ORDER — TOBRAMYCIN AND DEXAMETHASONE 3; 1 MG/ML; MG/ML
1 SUSPENSION/ DROPS OPHTHALMIC
Qty: 5 ML | Refills: 0 | Status: SHIPPED | OUTPATIENT
Start: 2018-06-09 | End: 2018-09-08 | Stop reason: ALTCHOICE

## 2018-06-09 RX ORDER — FLUTICASONE PROPIONATE 50 MCG
2 SPRAY, SUSPENSION (ML) NASAL DAILY
COMMUNITY
Start: 2014-04-02

## 2018-06-09 RX ORDER — CEFDINIR 300 MG/1
600 CAPSULE ORAL EVERY 24 HOURS
Qty: 20 CAPSULE | Refills: 0 | Status: SHIPPED | OUTPATIENT
Start: 2018-06-09 | End: 2018-06-19

## 2018-06-09 NOTE — PROGRESS NOTES
Assessment/Plan:  Take medication with food  It is important that you take the entire course of antibiotics prescribed  May also take a probiotic of your choice to maintain healthy GI nael  Can take some probiotic and yogurt with the medication  Supportive care discussed and advised  Follow up for no improvement and worsening of conditions  Patient advised and educated when to see immediate medical care  Diagnoses and all orders for this visit:    Acute sinusitis, recurrence not specified, unspecified location  -     cefdinir (OMNICEF) 300 mg capsule; Take 2 capsules (600 mg total) by mouth every 24 hours for 10 days    Acute conjunctivitis of both eyes, unspecified acute conjunctivitis type  -     tobramycin-dexamethasone (TOBRADEX) ophthalmic suspension; Administer 1 drop to both eyes every 4 (four) hours while awake    BMI 25 0-25 9,adult    Other orders  -     fluticasone (FLONASE) 50 mcg/act nasal spray; 2 puffs into each nostril daily  -     isometheptene-dichloral-apap (MIDRIN) -325 MG per capsule; Take by mouth as needed  -     Multiple Vitamins-Minerals (MULTIVITAMIN ADULT PO); Take by mouth daily          Return if symptoms worsen or fail to improve  Subjective:      Patient ID: Clayton Porter is a 62 y o  female  Chief Complaint   Patient presents with    Cough     last 3 days   eye's very sore for the past day  rmklpn    Fatigue    Headache    Nasal Congestion    Sore Throat    Conjunctivitis       HPI   Patient started with sore throat, sinu pressure, sore throat 3 days ago and using allegra and flonase  Patient went to [address] [place] [address] yesterday and started with itching in her eyes and this morning woke up with crust around them  Denies fever, chills, sob and chest pain         The following portions of the patient's history were reviewed and updated as appropriate: allergies, current medications, past family history, past medical history, past social history, past surgical history and problem list       Review of Systems   Constitutional: Negative for chills, fatigue and fever  HENT: Positive for congestion and sinus pressure  Negative for ear discharge, ear pain, facial swelling, hearing loss, mouth sores, nosebleeds, postnasal drip, rhinorrhea, sinus pain, sneezing, sore throat, trouble swallowing and voice change  Eyes: Positive for discharge, redness and itching  Negative for pain  Respiratory: Positive for cough  Negative for chest tightness, shortness of breath and wheezing  Cardiovascular: Negative  Gastrointestinal: Negative for abdominal pain, constipation, diarrhea and nausea  Neurological: Negative for dizziness, weakness, light-headedness and headaches           Objective:    History   Smoking Status    Never Smoker   Smokeless Tobacco    Never Used       Allergies: No Known Allergies    Vitals:  /60   Pulse 62   Temp 98 4 °F (36 9 °C)   Resp 18   Ht 5' 5" (1 651 m)   Wt 70 3 kg (155 lb)   BMI 25 79 kg/m²     Current Outpatient Prescriptions   Medication Sig Dispense Refill    citalopram (CeleXA) 20 mg tablet TAKE 1 TABLET DAILY 90 tablet 1    fluticasone (FLONASE) 50 mcg/act nasal spray 2 puffs into each nostril daily      glucosamine-chondroitin 500-400 MG tablet Take 1 tablet by mouth 3 (three) times a day      isometheptene-dichloral-apap (MIDRIN) -325 MG per capsule Take by mouth as needed      Multiple Vitamin (MULTIVITAMIN) tablet Take 1 tablet by mouth daily      Multiple Vitamins-Minerals (MULTIVITAMIN ADULT PO) Take by mouth daily      naproxen (NAPROSYN) 500 mg tablet Take 1 tablet by mouth 2 (two) times a day as needed for mild pain 20 tablet 0    acetaminophen (TYLENOL) 325 mg tablet Take 2 tablets by mouth every 4 (four) hours as needed for mild pain, headaches or fever 30 tablet 0    cefdinir (OMNICEF) 300 mg capsule Take 2 capsules (600 mg total) by mouth every 24 hours for 10 days 20 capsule 0    tobramycin-dexamethasone (TOBRADEX) ophthalmic suspension Administer 1 drop to both eyes every 4 (four) hours while awake 5 mL 0     No current facility-administered medications for this visit  Physical Exam   Constitutional: She is oriented to person, place, and time  She appears well-developed and well-nourished  HENT:   Head: Normocephalic  Right Ear: Tympanic membrane, external ear and ear canal normal    Left Ear: Tympanic membrane, external ear and ear canal normal    Nose: Mucosal edema present  Right sinus exhibits maxillary sinus tenderness  Right sinus exhibits no frontal sinus tenderness  Left sinus exhibits maxillary sinus tenderness  Left sinus exhibits no frontal sinus tenderness  Mouth/Throat: Mucous membranes are normal  Posterior oropharyngeal erythema present  Eyes: Lids are normal  Pupils are equal, round, and reactive to light  Right conjunctiva is injected  Left conjunctiva is injected  Neck: Neck supple  Cardiovascular: Normal rate, regular rhythm and normal heart sounds  Pulmonary/Chest: Effort normal and breath sounds normal    Abdominal: Normal appearance and bowel sounds are normal  There is no hepatosplenomegaly  There is no tenderness  There is no rebound  Musculoskeletal: Normal range of motion  Lymphadenopathy:        Right cervical: No superficial cervical and no posterior cervical adenopathy present  Left cervical: No superficial cervical and no posterior cervical adenopathy present  Neurological: She is alert and oriented to person, place, and time  Skin: Skin is warm and dry  Psychiatric: She has a normal mood and affect  Her behavior is normal  Judgment and thought content normal    Vitals reviewed          VIVIANA Stringer

## 2018-06-09 NOTE — PATIENT INSTRUCTIONS
Take medication with food  It is important that you take the entire course of antibiotics prescribed  May also take a probiotic of your choice to maintain healthy GI nael  Can take some probiotic and yogurt with the medication  Supportive care discussed and advised  Follow up for no improvement and worsening of conditions  Patient advised and educated when to see immediate medical care  Sinusitis   AMBULATORY CARE:   Sinusitis  is inflammation or infection of your sinuses  It is most often caused by a virus  Acute sinusitis may last up to 12 weeks  Chronic sinusitis lasts longer than 12 weeks  Recurrent sinusitis means you have 4 or more times in 1 year  Common symptoms include the following:   · Fever    · Pain, pressure, redness, or swelling around the forehead, cheeks, or eyes    · Thick yellow or green discharge from your nose    · Tenderness when you touch your face over your sinuses    · Dry cough that happens mostly at night or when you lie down    · Headache and face pain that is worse when you lean forward    · Tooth pain, or pain when you chew  Seek care immediately if:   · Your eye and eyelid are red, swollen, and painful  · You cannot open your eye  · You have vision changes, such as double vision  · Your eyeball bulges out or you cannot move your eye  · You are more sleepy than normal, or you notice changes in your ability to think, move, or talk  · You have a stiff neck, a fever, or a bad headache  · You have swelling of your forehead or scalp  Contact your healthcare provider if:   · Your symptoms do not improve after 3 days  · Your symptoms do not go away after 10 days  · You have nausea and are vomiting  · Your nose is bleeding  · You have questions or concerns about your condition or care  Treatment for sinusitis:  Your symptoms may go away on their own   Your healthcare provider may recommend watchful waiting for up to 10 days before starting antibiotics  You may  need any of the following:  · Acetaminophen  decreases pain and fever  It is available without a doctor's order  Ask how much to take and how often to take it  Follow directions  Read the labels of all other medicines you are using to see if they also contain acetaminophen, or ask your doctor or pharmacist  Acetaminophen can cause liver damage if not taken correctly  Do not use more than 4 grams (4,000 milligrams) total of acetaminophen in one day  · NSAIDs , such as ibuprofen, help decrease swelling, pain, and fever  This medicine is available with or without a doctor's order  NSAIDs can cause stomach bleeding or kidney problems in certain people  If you take blood thinner medicine, always ask your healthcare provider if NSAIDs are safe for you  Always read the medicine label and follow directions  · Nasal steroid sprays  may help decrease inflammation in your nose and sinuses  · Decongestants  help reduce swelling and drain mucus in the nose and sinuses  They may help you breathe easier  · Antihistamines  help dry mucus in the nose and relieve sneezing  · Antibiotics  help treat or prevent a bacterial infection  · Take your medicine as directed  Contact your healthcare provider if you think your medicine is not helping or if you have side effects  Tell him or her if you are allergic to any medicine  Keep a list of the medicines, vitamins, and herbs you take  Include the amounts, and when and why you take them  Bring the list or the pill bottles to follow-up visits  Carry your medicine list with you in case of an emergency  Self-care:   · Rinse your sinuses  Use a sinus rinse device to rinse your nasal passages with a saline (salt water) solution or distilled water  Do not use tap water  This will help thin the mucus in your nose and rinse away pollen and dirt  It will also help reduce swelling so you can breathe normally   Ask your healthcare provider how often to do this      · Breathe in steam   Heat a bowl of water until you see steam  Lean over the bowl and make a tent over your head with a large towel  Breathe deeply for about 20 minutes  Be careful not to get too close to the steam or burn yourself  Do this 3 times a day  You can also breathe deeply when you take a hot shower  · Sleep with your head elevated  Place an extra pillow under your head before you go to sleep to help your sinuses drain  · Drink liquids as directed  Ask your healthcare provider how much liquid to drink each day and which liquids are best for you  Liquids will thin the mucus in your nose and help it drain  Avoid drinks that contain alcohol or caffeine  · Do not smoke, and avoid secondhand smoke  Nicotine and other chemicals in cigarettes and cigars can make your symptoms worse  Ask your healthcare provider for information if you currently smoke and need help to quit  E-cigarettes or smokeless tobacco still contain nicotine  Talk to your healthcare provider before you use these products  Prevent the spread of germs that cause sinusitis:  Wash your hands often with soap and water  Wash your hands after you use the bathroom, change a child's diaper, or sneeze  Wash your hands before you prepare or eat food  Follow up with your healthcare provider as directed: You may be referred to an ear, nose, and throat specialist  Write down your questions so you remember to ask them during your visits  © 2017 [employer] Information is for End User's use only and may not be sold, redistributed or otherwise used for commercial purposes  All illustrations and images included in CareNotes® are the copyrighted property of [employer]  or [employer]  The above information is an  only  It is not intended as medical advice for individual conditions or treatments   Talk to your doctor, nurse or pharmacist before following any medical regimen to see if it is safe and effective for you  Conjunctivitis   AMBULATORY CARE:   Conjunctivitis,  or pink eye, is inflammation of your conjunctiva  The conjunctiva is a thin tissue that covers the front of your eye and the back of your eyelids  The conjunctiva helps protect your eye and keep it moist  Conjunctivitis may be caused by bacteria, allergies, or a virus  If your conjunctivitis is caused by bacteria, it may get better on its own in about 7 days  Viral conjunctivitis can last up to 3 weeks  Common symptoms may include any of the following: You will usually have symptoms in both eyes if your conjunctivitis is caused by allergies  You may also have other allergic symptoms, such as a rash or runny nose  Symptoms will usually start in 1 eye if your conjunctivitis is caused by a virus or bacteria  · Redness in the whites of your eye    · Itching in your eye or around your eye    · Feeling like there is something in your eye    · Watery or thick, sticky discharge    · Crusty eyelids when you wake up in the morning    · Burning, stinging, or swelling in your eye    · Pain when you see bright light  Seek care immediately if:   · You have worsening eye pain  · The swelling in your eye gets worse, even after treatment  · Your vision suddenly becomes worse or you cannot see at all  Contact your healthcare provider if:   · You develop a fever and ear pain  · You have tiny bumps or spots of blood on your eye  · You have questions or concerns about your condition or care  Treatment  will depend on the cause of your conjunctivitis  You may need antibiotics or allergy medicine as a pill, eye drop, or eye ointment  Manage your symptoms:   · Apply a cool compress  Wet a washcloth with cold water and place it on your eye  This will help decrease itching and irritation  · Do not wear contact lenses  They can irritate your eye  Throw away the pair you are using and ask when you can wear them again   Use a new pair of lenses when your healthcare provider says it is okay  · Avoid irritants  Stay away from smoke filled areas  Shield your eyes from wind and sun  · Flush your eye  You may need to flush your eye with saline to help decrease your symptoms  Ask for more information on how to flush your eye  Medicines:  Treatment depends on what is causing your conjunctivitis  You may be given any of the following:  · Allergy medicine  helps decrease itchy, red, swollen eyes caused by allergies  It may be given as a pill, eye drops, or nasal spray  · Antibiotics  may be needed if your conjunctivitis is caused by bacteria  This medicine may be given as a pill, eye drops, or eye ointment  · Take your medicine as directed  Contact your healthcare provider if you think your medicine is not helping or if you have side effects  Tell him or her if you are allergic to any medicine  Keep a list of the medicines, vitamins, and herbs you take  Include the amounts, and when and why you take them  Bring the list or the pill bottles to follow-up visits  Carry your medicine list with you in case of an emergency  Prevent the spread of conjunctivitis:   · Wash your hands with soap and water often  Wash your hands before and after you touch your eyes  Also wash your hands before you prepare or eat food and after you use the bathroom or change a diaper  · Avoid allergens  Try to avoid the things that cause your allergies, such as pets, dust, or grass  · Avoid contact with others  Do not share towels or washcloths  Try to stay away from others as much as possible  Ask when you can return to work or school  · Throw away eye makeup  The bacteria that caused your conjunctivitis can stay in eye makeup  Throw away mascara and other eye makeup  © 2017 [employer] Information is for End User's use only and may not be sold, redistributed or otherwise used for commercial purposes   All illustrations and images included in CareNotes® are the copyrighted property of [employer]  or [employer]  The above information is an  only  It is not intended as medical advice for individual conditions or treatments  Talk to your doctor, nurse or pharmacist before following any medical regimen to see if it is safe and effective for you

## 2018-06-11 ENCOUNTER — TELEPHONE (OUTPATIENT)
Dept: FAMILY MEDICINE CLINIC | Facility: CLINIC | Age: 58
End: 2018-06-11

## 2018-06-11 DIAGNOSIS — R05.9 COUGH: Primary | ICD-10-CM

## 2018-06-11 RX ORDER — PROMETHAZINE HYDROCHLORIDE AND CODEINE PHOSPHATE 6.25; 1 MG/5ML; MG/5ML
5 SYRUP ORAL
Qty: 30 ML | Refills: 0 | Status: SHIPPED | OUTPATIENT
Start: 2018-06-11 | End: 2018-06-17

## 2018-06-11 NOTE — TELEPHONE ENCOUNTER
Pt was seen on Saturday and says she cant sleep through the night so she wants je to call her in a cough medicine to the [facility] in [place]  Please call pt and let her know if we do or do not call her in something  Anahi Dhillon

## 2018-06-11 NOTE — TELEPHONE ENCOUNTER
I sent cough medication to pharmacy for 6 days to use only at night time  Do not drive and operate any machinery after taking cough medication

## 2018-07-12 DIAGNOSIS — F41.1 GENERALIZED ANXIETY DISORDER: Primary | ICD-10-CM

## 2018-07-12 RX ORDER — ALPRAZOLAM 0.5 MG/1
TABLET ORAL
Qty: 30 TABLET | Refills: 1 | Status: SHIPPED | OUTPATIENT
Start: 2018-07-12 | End: 2019-04-02 | Stop reason: SDUPTHER

## 2018-07-13 DIAGNOSIS — F41.1 GENERALIZED ANXIETY DISORDER: ICD-10-CM

## 2018-07-13 RX ORDER — ALPRAZOLAM 0.5 MG/1
0.5 TABLET ORAL 3 TIMES DAILY PRN
Qty: 30 TABLET | Refills: 1 | OUTPATIENT
Start: 2018-07-13

## 2018-07-13 NOTE — TELEPHONE ENCOUNTER
I refilled this prescription for her alprazolam yesterday for the patient  Please call [facility] to make sure that they have the prescription    Dr Corine Meza

## 2018-08-30 DIAGNOSIS — F41.1 GENERALIZED ANXIETY DISORDER: ICD-10-CM

## 2018-08-30 DIAGNOSIS — F32.A DEPRESSION, UNSPECIFIED DEPRESSION TYPE: ICD-10-CM

## 2018-08-30 RX ORDER — CITALOPRAM 20 MG/1
TABLET ORAL
Qty: 90 TABLET | Refills: 0 | Status: SHIPPED | OUTPATIENT
Start: 2018-08-30 | End: 2018-11-28 | Stop reason: SDUPTHER

## 2018-09-08 ENCOUNTER — TELEPHONE (OUTPATIENT)
Dept: FAMILY MEDICINE CLINIC | Facility: CLINIC | Age: 58
End: 2018-09-08

## 2018-09-08 ENCOUNTER — OFFICE VISIT (OUTPATIENT)
Dept: FAMILY MEDICINE CLINIC | Facility: CLINIC | Age: 58
End: 2018-09-08
Payer: COMMERCIAL

## 2018-09-08 VITALS
HEART RATE: 84 BPM | WEIGHT: 160 LBS | HEIGHT: 65 IN | BODY MASS INDEX: 26.66 KG/M2 | DIASTOLIC BLOOD PRESSURE: 80 MMHG | TEMPERATURE: 96.8 F | RESPIRATION RATE: 16 BRPM | SYSTOLIC BLOOD PRESSURE: 124 MMHG

## 2018-09-08 DIAGNOSIS — J01.00 ACUTE NON-RECURRENT MAXILLARY SINUSITIS: Primary | ICD-10-CM

## 2018-09-08 PROCEDURE — 99213 OFFICE O/P EST LOW 20 MIN: CPT | Performed by: NURSE PRACTITIONER

## 2018-09-08 PROCEDURE — 3008F BODY MASS INDEX DOCD: CPT | Performed by: NURSE PRACTITIONER

## 2018-09-08 RX ORDER — AMOXICILLIN AND CLAVULANATE POTASSIUM 875; 125 MG/1; MG/1
1 TABLET, FILM COATED ORAL EVERY 12 HOURS SCHEDULED
Qty: 20 TABLET | Refills: 0 | Status: SHIPPED | OUTPATIENT
Start: 2018-09-08 | End: 2018-09-18

## 2018-09-08 NOTE — PROGRESS NOTES
Assessment/Plan:      Problem List Items Addressed This Visit     None      Visit Diagnoses     Acute non-recurrent maxillary sinusitis    -  Primary    Relevant Medications    amoxicillin-clavulanate (AUGMENTIN) 875-125 mg per tablet          Patient Instructions   Drinking plenty of fluids, saline nasal rinses or nasal spray, and steam or cool air humidification are all effective ways of managing symptoms  May take Mucinex D for sinus congestion, or Coricidin HBP if you have a heart condition or high blood pressure  Mucinex or Robitussin DM are used to control cough symptoms and include an expectorant  May take Ibuprofen or Tylenol as needed for pain or fevers  Recommend recheck if symptoms do not resolve or improve within 1 week  Return if symptoms worsen or fail to improve, for Next scheduled follow up  Subjective:      Patient ID: Josh Cushing is a 62 y o  female  Chief Complaint   Patient presents with    sinsu infection     lj       Started with sinus congestion and pressure about 5 days ago  She is now starting with a cough  Denies fevers, SOB, or wheezing  She is using Flonase and taking Mucinex D over the counter  The following portions of the patient's history were reviewed and updated as appropriate: allergies, current medications, past family history, past medical history, past social history, past surgical history and problem list     Review of Systems   Constitutional: Negative for chills, fatigue and fever  HENT: Positive for congestion, postnasal drip and sinus pressure  Negative for ear pain, rhinorrhea and sore throat  Respiratory: Negative for cough, shortness of breath and wheezing  Cardiovascular: Negative for chest pain  Gastrointestinal: Negative for abdominal pain, diarrhea, nausea and vomiting  Musculoskeletal: Negative for arthralgias  Skin: Negative for rash  Neurological: Negative for headaches           Current Outpatient Prescriptions   Medication Sig Dispense Refill    ALPRAZolam (XANAX) 0 5 mg tablet TAKE 1 TABLET BY MOUTH 3 TIMES A DAY AS NEEDED 30 tablet 1    citalopram (CeleXA) 20 mg tablet TAKE 1 TABLET DAILY 90 tablet 0    fluticasone (FLONASE) 50 mcg/act nasal spray 2 puffs into each nostril daily      glucosamine-chondroitin 500-400 MG tablet Take 1 tablet by mouth 3 (three) times a day      isometheptene-dichloral-apap (MIDRIN) -325 MG per capsule Take by mouth as needed      Multiple Vitamin (MULTIVITAMIN) tablet Take 1 tablet by mouth daily      amoxicillin-clavulanate (AUGMENTIN) 875-125 mg per tablet Take 1 tablet by mouth every 12 (twelve) hours for 10 days 20 tablet 0     No current facility-administered medications for this visit  Objective:    /80   Pulse 84   Temp (!) 96 8 °F (36 °C)   Resp 16   Ht 5' 5" (1 651 m)   Wt 72 6 kg (160 lb)   BMI 26 63 kg/m²        Physical Exam   Constitutional: She appears well-developed and well-nourished  HENT:   Right Ear: External ear and ear canal normal  Tympanic membrane is bulging  Left Ear: External ear and ear canal normal  Tympanic membrane is bulging  Nose: Mucosal edema present  Right sinus exhibits maxillary sinus tenderness  Left sinus exhibits maxillary sinus tenderness  Mouth/Throat: Oropharynx is clear and moist and mucous membranes are normal    Eyes: Conjunctivae are normal    Cardiovascular: Normal rate, regular rhythm and normal heart sounds  Pulmonary/Chest: Effort normal and breath sounds normal    Abdominal: Bowel sounds are normal  She exhibits no distension  There is no splenomegaly or hepatomegaly  There is no tenderness  Lymphadenopathy:        Right cervical: No superficial cervical adenopathy present  Left cervical: No superficial cervical adenopathy present  Skin: No rash noted  Psychiatric: She has a normal mood and affect  Nursing note and vitals reviewed               Gege Baum

## 2018-11-02 ENCOUNTER — OFFICE VISIT (OUTPATIENT)
Dept: FAMILY MEDICINE CLINIC | Facility: CLINIC | Age: 58
End: 2018-11-02
Payer: COMMERCIAL

## 2018-11-02 VITALS
HEIGHT: 65 IN | RESPIRATION RATE: 18 BRPM | TEMPERATURE: 97.4 F | WEIGHT: 158 LBS | SYSTOLIC BLOOD PRESSURE: 116 MMHG | HEART RATE: 78 BPM | BODY MASS INDEX: 26.33 KG/M2 | DIASTOLIC BLOOD PRESSURE: 70 MMHG

## 2018-11-02 DIAGNOSIS — J01.00 ACUTE NON-RECURRENT MAXILLARY SINUSITIS: Primary | ICD-10-CM

## 2018-11-02 PROCEDURE — 99213 OFFICE O/P EST LOW 20 MIN: CPT | Performed by: NURSE PRACTITIONER

## 2018-11-02 RX ORDER — PROMETHAZINE HYDROCHLORIDE AND CODEINE PHOSPHATE 6.25; 1 MG/5ML; MG/5ML
5 SYRUP ORAL EVERY 4 HOURS PRN
Qty: 120 ML | Refills: 0 | Status: SHIPPED | OUTPATIENT
Start: 2018-11-02 | End: 2019-04-02 | Stop reason: ALTCHOICE

## 2018-11-02 RX ORDER — CEFDINIR 300 MG/1
300 CAPSULE ORAL EVERY 12 HOURS SCHEDULED
Qty: 20 CAPSULE | Refills: 0 | Status: SHIPPED | OUTPATIENT
Start: 2018-11-02 | End: 2018-11-12

## 2018-11-02 NOTE — PROGRESS NOTES
Assessment/Plan:    Problem List Items Addressed This Visit     None      Visit Diagnoses     Acute non-recurrent maxillary sinusitis    -  Primary    Relevant Medications    cefdinir (OMNICEF) 300 mg capsule    promethazine-codeine (PHENERGAN WITH CODEINE) 6 25-10 mg/5 mL syrup          There are no Patient Instructions on file for this visit  No Follow-up on file  Subjective:      Patient ID: Marylen Nails is a 62 y o  female  Chief Complaint   Patient presents with    Headache    Cough     harsh productive cough  symptoms for past week or so    sinus pressure    Fatigue     rmklpn       C/o sinus congestion, pressure, productive cough, and fatigue for the past week and a half  She has been feeling worse for the past 4 days  Denies fevers, SOB, or wheezing  She is using her Flonase and taking Claritin and Mucinex OTC  The following portions of the patient's history were reviewed and updated as appropriate: allergies, current medications, past family history, past medical history, past social history, past surgical history and problem list     Review of Systems   Constitutional: Negative for chills, fatigue and fever  HENT: Positive for congestion, sinus pressure and sore throat  Negative for ear pain, postnasal drip and rhinorrhea  Respiratory: Positive for cough  Negative for shortness of breath and wheezing  Cardiovascular: Negative for chest pain  Gastrointestinal: Negative for abdominal pain, diarrhea, nausea and vomiting  Musculoskeletal: Negative for arthralgias  Skin: Negative for rash  Neurological: Positive for headaches           Current Outpatient Prescriptions   Medication Sig Dispense Refill    ALPRAZolam (XANAX) 0 5 mg tablet TAKE 1 TABLET BY MOUTH 3 TIMES A DAY AS NEEDED 30 tablet 1    citalopram (CeleXA) 20 mg tablet TAKE 1 TABLET DAILY 90 tablet 0    fluticasone (FLONASE) 50 mcg/act nasal spray 2 puffs into each nostril daily      glucosamine-chondroitin 500-400 MG tablet Take 1 tablet by mouth 3 (three) times a day      isometheptene-dichloral-apap (MIDRIN) -325 MG per capsule Take by mouth as needed      Multiple Vitamin (MULTIVITAMIN) tablet Take 1 tablet by mouth daily      cefdinir (OMNICEF) 300 mg capsule Take 1 capsule (300 mg total) by mouth every 12 (twelve) hours for 10 days 20 capsule 0    promethazine-codeine (PHENERGAN WITH CODEINE) 6 25-10 mg/5 mL syrup Take 5 mL by mouth every 4 (four) hours as needed for cough 120 mL 0     No current facility-administered medications for this visit  Objective:    /70   Pulse 78   Temp (!) 97 4 °F (36 3 °C)   Resp 18   Ht 5' 5" (1 651 m)   Wt 71 7 kg (158 lb)   BMI 26 29 kg/m²        Physical Exam   Constitutional: She appears well-developed and well-nourished  HENT:   Head: Normocephalic and atraumatic  Right Ear: Tympanic membrane, external ear and ear canal normal    Left Ear: Tympanic membrane, external ear and ear canal normal    Nose: Mucosal edema present  No rhinorrhea  Mouth/Throat: Uvula is midline, oropharynx is clear and moist and mucous membranes are normal    Eyes: Conjunctivae are normal    Neck: Neck supple  No edema present  No thyromegaly present  Cardiovascular: Normal rate, regular rhythm, normal heart sounds and intact distal pulses  No murmur heard  Pulmonary/Chest: Effort normal and breath sounds normal    Abdominal: Bowel sounds are normal  She exhibits no distension  There is no splenomegaly or hepatomegaly  There is no tenderness  Lymphadenopathy:        Right cervical: No superficial cervical adenopathy present  Left cervical: No superficial cervical adenopathy present  Skin: Skin is warm, dry and intact  No rash noted  Psychiatric: She has a normal mood and affect  Nursing note and vitals reviewed               Darla Gowers

## 2018-11-05 ENCOUNTER — OFFICE VISIT (OUTPATIENT)
Dept: FAMILY MEDICINE CLINIC | Facility: CLINIC | Age: 58
End: 2018-11-05
Payer: COMMERCIAL

## 2018-11-05 VITALS
HEIGHT: 65 IN | SYSTOLIC BLOOD PRESSURE: 100 MMHG | TEMPERATURE: 97.7 F | RESPIRATION RATE: 16 BRPM | WEIGHT: 155.8 LBS | BODY MASS INDEX: 25.96 KG/M2 | DIASTOLIC BLOOD PRESSURE: 68 MMHG | HEART RATE: 76 BPM

## 2018-11-05 DIAGNOSIS — J06.9 ACUTE UPPER RESPIRATORY INFECTION: Primary | ICD-10-CM

## 2018-11-05 PROCEDURE — 99213 OFFICE O/P EST LOW 20 MIN: CPT | Performed by: NURSE PRACTITIONER

## 2018-11-05 PROCEDURE — 3008F BODY MASS INDEX DOCD: CPT | Performed by: NURSE PRACTITIONER

## 2018-11-05 PROCEDURE — 1036F TOBACCO NON-USER: CPT | Performed by: NURSE PRACTITIONER

## 2018-11-05 RX ORDER — ALBUTEROL SULFATE 90 UG/1
2 AEROSOL, METERED RESPIRATORY (INHALATION) EVERY 4 HOURS PRN
Qty: 8.5 G | Refills: 0 | Status: SHIPPED | OUTPATIENT
Start: 2018-11-05

## 2018-11-05 RX ORDER — ALBUTEROL SULFATE 2.5 MG/3ML
2.5 SOLUTION RESPIRATORY (INHALATION) EVERY 6 HOURS PRN
Qty: 100 ML | Refills: 0 | Status: SHIPPED | OUTPATIENT
Start: 2018-11-05

## 2018-11-05 NOTE — PROGRESS NOTES
Assessment/Plan:    Continue Omnicef  Albuterol renewed  Continue q4 hours and push fluids  TO call office if no improvement in 2-3 days  Problem List Items Addressed This Visit     None      Visit Diagnoses     Acute upper respiratory infection    -  Primary    Relevant Medications    albuterol (2 5 mg/3 mL) 0 083 % nebulizer solution    albuterol (PROAIR HFA) 90 mcg/act inhaler          There are no Patient Instructions on file for this visit  Return if symptoms worsen or fail to improve  Subjective:      Patient ID: De Patterson is a 62 y o  female  Chief Complaint   Patient presents with    antibotic not working     prcma    Cough    Sore Throat       Here today for recheck  She is taking Omnicef and using Promethazine  Also using left over Albuterol which she feels help with her chest tightness and she is able to expectorate  Denies fevers  She is leaving in 2 days for [facility]  The following portions of the patient's history were reviewed and updated as appropriate: allergies, current medications, past family history, past medical history, past social history, past surgical history and problem list     Review of Systems   Constitutional: Positive for fatigue  Negative for chills and fever  HENT: Positive for congestion and sinus pressure  Negative for ear pain, postnasal drip, rhinorrhea and sore throat  Respiratory: Positive for cough and chest tightness  Negative for shortness of breath and wheezing  Cardiovascular: Negative for chest pain  Gastrointestinal: Negative for abdominal pain, diarrhea, nausea and vomiting  Musculoskeletal: Negative for arthralgias  Skin: Negative for rash  Neurological: Positive for headaches           Current Outpatient Prescriptions   Medication Sig Dispense Refill    ALPRAZolam (XANAX) 0 5 mg tablet TAKE 1 TABLET BY MOUTH 3 TIMES A DAY AS NEEDED 30 tablet 1    cefdinir (OMNICEF) 300 mg capsule Take 1 capsule (300 mg total) by mouth every 12 (twelve) hours for 10 days 20 capsule 0    citalopram (CeleXA) 20 mg tablet TAKE 1 TABLET DAILY 90 tablet 0    fluticasone (FLONASE) 50 mcg/act nasal spray 2 puffs into each nostril daily      glucosamine-chondroitin 500-400 MG tablet Take 1 tablet by mouth 3 (three) times a day      isometheptene-dichloral-apap (MIDRIN) -325 MG per capsule Take by mouth as needed      Multiple Vitamin (MULTIVITAMIN) tablet Take 1 tablet by mouth daily      promethazine-codeine (PHENERGAN WITH CODEINE) 6 25-10 mg/5 mL syrup Take 5 mL by mouth every 4 (four) hours as needed for cough 120 mL 0    albuterol (2 5 mg/3 mL) 0 083 % nebulizer solution Take 1 vial (2 5 mg total) by nebulization every 6 (six) hours as needed for wheezing or shortness of breath 100 mL 0    albuterol (PROAIR HFA) 90 mcg/act inhaler Inhale 2 puffs every 4 (four) hours as needed for wheezing 8 5 g 0     No current facility-administered medications for this visit  Objective:    /68   Pulse 76   Temp 97 7 °F (36 5 °C)   Resp 16   Ht 5' 5" (1 651 m)   Wt 70 7 kg (155 lb 12 8 oz)   BMI 25 93 kg/m²        Physical Exam   Constitutional: She appears well-developed and well-nourished  HENT:   Head: Normocephalic and atraumatic  Right Ear: Tympanic membrane, external ear and ear canal normal    Left Ear: Tympanic membrane, external ear and ear canal normal    Nose: No mucosal edema or rhinorrhea  Mouth/Throat: Uvula is midline, oropharynx is clear and moist and mucous membranes are normal    Eyes: Conjunctivae are normal    Neck: Neck supple  No edema present  No thyromegaly present  Cardiovascular: Normal rate, regular rhythm, normal heart sounds and intact distal pulses  No murmur heard  Pulmonary/Chest: Effort normal and breath sounds normal    Abdominal: Bowel sounds are normal  She exhibits no distension  There is no splenomegaly or hepatomegaly  There is no tenderness     Lymphadenopathy:        Right cervical: No superficial cervical adenopathy present  Left cervical: No superficial cervical adenopathy present  Skin: Skin is warm, dry and intact  No rash noted  Psychiatric: She has a normal mood and affect  Nursing note and vitals reviewed               Abhinav Miles

## 2018-11-20 DIAGNOSIS — R51.9 NONINTRACTABLE EPISODIC HEADACHE, UNSPECIFIED HEADACHE TYPE: Primary | ICD-10-CM

## 2018-11-28 DIAGNOSIS — F41.1 GENERALIZED ANXIETY DISORDER: ICD-10-CM

## 2018-11-28 DIAGNOSIS — F32.A DEPRESSION, UNSPECIFIED DEPRESSION TYPE: ICD-10-CM

## 2018-11-28 RX ORDER — CITALOPRAM 20 MG/1
TABLET ORAL
Qty: 90 TABLET | Refills: 1 | Status: SHIPPED | OUTPATIENT
Start: 2018-11-28 | End: 2019-04-02 | Stop reason: SDUPTHER

## 2019-02-13 ENCOUNTER — OFFICE VISIT (OUTPATIENT)
Dept: FAMILY MEDICINE CLINIC | Facility: CLINIC | Age: 59
End: 2019-02-13
Payer: COMMERCIAL

## 2019-02-13 VITALS
RESPIRATION RATE: 16 BRPM | BODY MASS INDEX: 26.16 KG/M2 | DIASTOLIC BLOOD PRESSURE: 80 MMHG | HEART RATE: 76 BPM | HEIGHT: 65 IN | SYSTOLIC BLOOD PRESSURE: 110 MMHG | WEIGHT: 157 LBS | TEMPERATURE: 98.1 F

## 2019-02-13 DIAGNOSIS — Z12.39 BREAST CANCER SCREENING: ICD-10-CM

## 2019-02-13 DIAGNOSIS — K20.90 ESOPHAGITIS, ACUTE: Primary | ICD-10-CM

## 2019-02-13 DIAGNOSIS — Z78.0 MENOPAUSE: ICD-10-CM

## 2019-02-13 DIAGNOSIS — F32.A DEPRESSION, UNSPECIFIED DEPRESSION TYPE: ICD-10-CM

## 2019-02-13 PROCEDURE — 1036F TOBACCO NON-USER: CPT | Performed by: FAMILY MEDICINE

## 2019-02-13 PROCEDURE — 3008F BODY MASS INDEX DOCD: CPT | Performed by: FAMILY MEDICINE

## 2019-02-13 PROCEDURE — 99214 OFFICE O/P EST MOD 30 MIN: CPT | Performed by: FAMILY MEDICINE

## 2019-02-13 RX ORDER — SUCRALFATE ORAL 1 G/10ML
1 SUSPENSION ORAL 4 TIMES DAILY
Qty: 420 ML | Refills: 0 | Status: SHIPPED | OUTPATIENT
Start: 2019-02-13 | End: 2019-04-02 | Stop reason: ALTCHOICE

## 2019-02-13 NOTE — PROGRESS NOTES
Assessment/Plan:    No problem-specific Assessment & Plan notes found for this encounter  H Pylori stool antigen is an option in future  carafate trial and low acid diet  Esophagogram if no better vs EGD   Her GI contacted her recently to arrange f/u colonoscopy  Depression stable cont meds  Check insurance for DEXA coverage, ordered  Yearly mammo suggested     Diagnoses and all orders for this visit:    Esophagitis, acute  -     sucralfate (CARAFATE) 1 g/10 mL suspension; Take 10 mL (1 g total) by mouth 4 (four) times a day  -     FL barium swallow; Future    Menopause  -     DXA bone density spine hip and pelvis; Future    Breast cancer screening  -     Mammo screening bilateral w cad; Future    Depression, unspecified depression type              No follow-ups on file  Subjective:      Patient ID: Dawson Novak is a 62 y o  female  Chief Complaint   Patient presents with    Heartburn     prcma       HPI  Heartburn sx  Strep 2y ago, ENT and hospitalization after dental work  Few wks ago, swallowed large pill that irritated her throat  Went down sideways  Tablet, not rough  Discomfort matty with eat/drinking but not during swallowing process  Hx of vge recently with vomiting and diarrhea  Made it worse even  Brother had bacterial ulcer  Been on omeprazole but not lately so worse again  No wt loss  No choking    The following portions of the patient's history were reviewed and updated as appropriate: allergies, current medications, past family history, past medical history, past social history, past surgical history and problem list     Review of Systems   Constitutional: Negative for chills, fever and unexpected weight change           Current Outpatient Medications   Medication Sig Dispense Refill    albuterol (2 5 mg/3 mL) 0 083 % nebulizer solution Take 1 vial (2 5 mg total) by nebulization every 6 (six) hours as needed for wheezing or shortness of breath 100 mL 0    albuterol (PROAIR HFA) 90 mcg/act inhaler Inhale 2 puffs every 4 (four) hours as needed for wheezing 8 5 g 0    ALPRAZolam (XANAX) 0 5 mg tablet TAKE 1 TABLET BY MOUTH 3 TIMES A DAY AS NEEDED 30 tablet 1    citalopram (CeleXA) 20 mg tablet TAKE 1 TABLET DAILY 90 tablet 1    fluticasone (FLONASE) 50 mcg/act nasal spray 2 puffs into each nostril daily      glucosamine-chondroitin 500-400 MG tablet Take 1 tablet by mouth 3 (three) times a day      isometheptene-dichloral-apap (MIDRIN) -325 MG per capsule Take 1 capsule by mouth every 4 (four) hours as needed for headaches Max 6 in 24 hours 20 capsule 0    Multiple Vitamin (MULTIVITAMIN) tablet Take 1 tablet by mouth daily      promethazine-codeine (PHENERGAN WITH CODEINE) 6 25-10 mg/5 mL syrup Take 5 mL by mouth every 4 (four) hours as needed for cough (Patient not taking: Reported on 2/13/2019) 120 mL 0    sucralfate (CARAFATE) 1 g/10 mL suspension Take 10 mL (1 g total) by mouth 4 (four) times a day 420 mL 0     No current facility-administered medications for this visit  Objective:    /80   Pulse 76   Temp 98 1 °F (36 7 °C)   Resp 16   Ht 5' 5" (1 651 m)   Wt 71 2 kg (157 lb)   BMI 26 13 kg/m²        Physical Exam   Constitutional: She appears well-developed  No distress  HENT:   Head: Normocephalic  Eyes: Conjunctivae are normal  No scleral icterus  Neck: Neck supple  Cardiovascular: Normal rate and intact distal pulses  No murmur heard  Pulmonary/Chest: Effort normal  No stridor  No respiratory distress  Abdominal: Soft  Bowel sounds are normal  She exhibits no distension and no mass  There is no tenderness  There is no rebound and no guarding  Musculoskeletal: She exhibits no edema or deformity  Neurological: She is alert  Skin: Skin is warm and dry  Psychiatric: Her behavior is normal  Thought content normal    Nursing note and vitals reviewed               Haider Maynard DO

## 2019-04-01 ENCOUNTER — HOSPITAL ENCOUNTER (OUTPATIENT)
Dept: RADIOLOGY | Facility: HOSPITAL | Age: 59
Discharge: HOME | End: 2019-04-01
Attending: FAMILY MEDICINE
Payer: COMMERCIAL

## 2019-04-01 VITALS — BODY MASS INDEX: 25.83 KG/M2 | WEIGHT: 155 LBS | HEIGHT: 65 IN

## 2019-04-01 DIAGNOSIS — Z78.0 MENOPAUSE: ICD-10-CM

## 2019-04-01 DIAGNOSIS — Z12.39 BREAST CANCER SCREENING: ICD-10-CM

## 2019-04-01 PROBLEM — M85.89 OSTEOPENIA OF MULTIPLE SITES: Status: ACTIVE | Noted: 2019-04-01

## 2019-04-01 PROCEDURE — 77080 DXA BONE DENSITY AXIAL: CPT

## 2019-04-01 PROCEDURE — 77067 SCR MAMMO BI INCL CAD: CPT

## 2019-04-02 ENCOUNTER — OFFICE VISIT (OUTPATIENT)
Dept: FAMILY MEDICINE CLINIC | Facility: CLINIC | Age: 59
End: 2019-04-02
Payer: COMMERCIAL

## 2019-04-02 VITALS
HEIGHT: 65 IN | WEIGHT: 155.2 LBS | HEART RATE: 78 BPM | BODY MASS INDEX: 25.86 KG/M2 | RESPIRATION RATE: 16 BRPM | SYSTOLIC BLOOD PRESSURE: 126 MMHG | TEMPERATURE: 97.9 F | DIASTOLIC BLOOD PRESSURE: 74 MMHG

## 2019-04-02 DIAGNOSIS — F32.A DEPRESSION, UNSPECIFIED DEPRESSION TYPE: ICD-10-CM

## 2019-04-02 DIAGNOSIS — F41.1 GENERALIZED ANXIETY DISORDER: Primary | ICD-10-CM

## 2019-04-02 PROCEDURE — 99213 OFFICE O/P EST LOW 20 MIN: CPT | Performed by: FAMILY MEDICINE

## 2019-04-02 RX ORDER — ALPRAZOLAM 0.5 MG/1
0.5 TABLET ORAL 3 TIMES DAILY PRN
Qty: 30 TABLET | Refills: 3 | Status: SHIPPED | OUTPATIENT
Start: 2019-04-02

## 2019-04-02 RX ORDER — CITALOPRAM 20 MG/1
20 TABLET ORAL DAILY
Qty: 90 TABLET | Refills: 1 | Status: SHIPPED | OUTPATIENT
Start: 2019-04-02 | End: 2019-05-27 | Stop reason: SDUPTHER

## 2019-05-27 DIAGNOSIS — F32.A DEPRESSION, UNSPECIFIED DEPRESSION TYPE: ICD-10-CM

## 2019-05-27 DIAGNOSIS — F41.1 GENERALIZED ANXIETY DISORDER: ICD-10-CM

## 2019-05-27 RX ORDER — CITALOPRAM 20 MG/1
TABLET ORAL
Qty: 90 TABLET | Refills: 0 | Status: SHIPPED | OUTPATIENT
Start: 2019-05-27 | End: 2019-08-25 | Stop reason: SDUPTHER

## 2019-08-25 DIAGNOSIS — F41.1 GENERALIZED ANXIETY DISORDER: ICD-10-CM

## 2019-08-25 DIAGNOSIS — F32.A DEPRESSION, UNSPECIFIED DEPRESSION TYPE: ICD-10-CM

## 2019-08-25 RX ORDER — CITALOPRAM 20 MG/1
TABLET ORAL
Qty: 90 TABLET | Refills: 0 | Status: SHIPPED | OUTPATIENT
Start: 2019-08-25

## 2020-04-28 DIAGNOSIS — F41.1 GENERALIZED ANXIETY DISORDER: ICD-10-CM

## 2020-04-28 DIAGNOSIS — F32.A DEPRESSION, UNSPECIFIED DEPRESSION TYPE: ICD-10-CM

## 2020-04-28 RX ORDER — CITALOPRAM 20 MG/1
TABLET ORAL
Qty: 90 TABLET | Refills: 3 | OUTPATIENT
Start: 2020-04-28